# Patient Record
Sex: MALE | Race: BLACK OR AFRICAN AMERICAN | NOT HISPANIC OR LATINO | Employment: OTHER | URBAN - METROPOLITAN AREA
[De-identification: names, ages, dates, MRNs, and addresses within clinical notes are randomized per-mention and may not be internally consistent; named-entity substitution may affect disease eponyms.]

---

## 2022-01-22 PROBLEM — I21.4 NSTEMI (NON-ST ELEVATED MYOCARDIAL INFARCTION): Status: ACTIVE | Noted: 2022-01-22

## 2022-01-22 PROBLEM — I50.1 PULMONARY EDEMA CARDIAC CAUSE: Status: ACTIVE | Noted: 2022-01-22

## 2022-01-22 PROBLEM — E11.65 TYPE 2 DIABETES MELLITUS WITH HYPERGLYCEMIA, WITHOUT LONG-TERM CURRENT USE OF INSULIN: Status: ACTIVE | Noted: 2022-01-22

## 2022-01-22 PROBLEM — I10 ESSENTIAL (PRIMARY) HYPERTENSION: Status: ACTIVE | Noted: 2022-01-22

## 2022-01-25 ENCOUNTER — TELEPHONE (OUTPATIENT)
Dept: CARDIOLOGY | Facility: CLINIC | Age: 57
End: 2022-01-25
Payer: MEDICAID

## 2022-01-25 NOTE — TELEPHONE ENCOUNTER
----- Message from Marnie Teresa sent at 1/25/2022  3:39 PM CST -----  Regarding: advice  Contact: [facility]/[phone]  Type: Needs Medical Advice  Who Called:  [facility]/[phone]    Additional Information: Needs a two month hospital follow up from today. Could not schedule it in Epic due to the insurance. Please call patient to schedule at [phone] .

## 2022-01-25 NOTE — TELEPHONE ENCOUNTER
----- Message from Lisa Sands sent at 1/25/2022  4:12 PM CST -----  Type:  Patient Returning Call    Who Called:  Friend Digna  Who Left Message for Patient:  Orquidea  Does the patient know what this is regarding?:  hospital F/U  Best Call Back Number: [phone]  Additional Information:  Dr Chaudhry performed stints and pt is needing a F/U

## 2022-01-27 ENCOUNTER — TELEPHONE (OUTPATIENT)
Dept: CARDIOLOGY | Facility: CLINIC | Age: 57
End: 2022-01-27
Payer: MEDICAID

## 2022-01-27 NOTE — TELEPHONE ENCOUNTER
"Spoke to pt over the phone , pt states "I have tightness in my chest , no chest pain or Sob . I was doing work outside . Wanted to let Dr chauhan know " Pt is coming in February for office visit   "

## 2022-01-27 NOTE — TELEPHONE ENCOUNTER
----- Message from Sri Jose Ramon sent at 1/27/2022  3:53 PM CST -----  Contact: Patient  Type: Patient Call Back         Who called: Patient         What is the request in detail: calling regarding the stent he has in;; states he had a tightness in his chest and got winded; ust want to speak with someone; please advise        Best call back number: [phone]         Additional Information:   [facility] - [facility] [address] [facility] [address] [facility] [address]  [address]  [facility] [address]  Phone: [phone] Fax: [phone]    [facility]/pharmacy #[id number] - [place], [place] - [address] [place] [id number]  Phone: [phone] Fax: [phone]             Thank You

## 2022-01-28 NOTE — TELEPHONE ENCOUNTER
"Spoke to pt over the phone ifnrom him "  OK KEEP MEDS    Message text      ", per dr chauhan. PT VU   "

## 2022-02-15 ENCOUNTER — OFFICE VISIT (OUTPATIENT)
Dept: CARDIOLOGY | Facility: CLINIC | Age: 57
End: 2022-02-15
Payer: MEDICARE

## 2022-02-15 VITALS
WEIGHT: 183.19 LBS | SYSTOLIC BLOOD PRESSURE: 104 MMHG | HEIGHT: 70 IN | DIASTOLIC BLOOD PRESSURE: 60 MMHG | BODY MASS INDEX: 26.22 KG/M2 | HEART RATE: 59 BPM

## 2022-02-15 DIAGNOSIS — I10 ESSENTIAL (PRIMARY) HYPERTENSION: Chronic | ICD-10-CM

## 2022-02-15 DIAGNOSIS — N52.8 OTHER MALE ERECTILE DYSFUNCTION: Chronic | ICD-10-CM

## 2022-02-15 DIAGNOSIS — E66.3 OVERWEIGHT (BMI 25.0-29.9): Chronic | ICD-10-CM

## 2022-02-15 DIAGNOSIS — I22.2 SUBSEQUENT NON-ST ELEVATION (NSTEMI) MYOCARDIAL INFARCTION: Primary | ICD-10-CM

## 2022-02-15 DIAGNOSIS — Z79.02 LONG TERM (CURRENT) USE OF ANTITHROMBOTICS/ANTIPLATELETS: ICD-10-CM

## 2022-02-15 DIAGNOSIS — E78.00 HYPERCHOLESTEROLEMIA: Chronic | ICD-10-CM

## 2022-02-15 PROCEDURE — 99214 PR OFFICE/OUTPT VISIT, EST, LEVL IV, 30-39 MIN: ICD-10-PCS | Mod: S$GLB,,, | Performed by: INTERNAL MEDICINE

## 2022-02-15 PROCEDURE — 99999 PR PBB SHADOW E&M-EST. PATIENT-LVL III: ICD-10-PCS | Mod: PBBFAC,,, | Performed by: INTERNAL MEDICINE

## 2022-02-15 PROCEDURE — 99214 OFFICE O/P EST MOD 30 MIN: CPT | Mod: S$GLB,,, | Performed by: INTERNAL MEDICINE

## 2022-02-15 PROCEDURE — 99999 PR PBB SHADOW E&M-EST. PATIENT-LVL III: CPT | Mod: PBBFAC,,, | Performed by: INTERNAL MEDICINE

## 2022-02-15 PROCEDURE — 99213 OFFICE O/P EST LOW 20 MIN: CPT | Mod: PBBFAC,PO | Performed by: INTERNAL MEDICINE

## 2022-02-15 RX ORDER — SILDENAFIL 100 MG/1
100 TABLET, FILM COATED ORAL DAILY PRN
Qty: 10 TABLET | Refills: 1 | Status: SHIPPED | OUTPATIENT
Start: 2022-02-15 | End: 2022-05-13 | Stop reason: SDUPTHER

## 2022-02-15 RX ORDER — ATORVASTATIN CALCIUM 10 MG/1
10 TABLET, FILM COATED ORAL NIGHTLY
Qty: 90 TABLET | Refills: 1 | Status: SHIPPED | OUTPATIENT
Start: 2022-02-15 | End: 2022-05-13 | Stop reason: DRUGHIGH

## 2022-02-15 RX ORDER — CLOPIDOGREL BISULFATE 75 MG/1
75 TABLET ORAL DAILY
Qty: 90 TABLET | Refills: 1 | Status: SHIPPED | OUTPATIENT
Start: 2022-02-15 | End: 2022-05-13 | Stop reason: SDUPTHER

## 2022-02-15 RX ORDER — LISINOPRIL 20 MG/1
20 TABLET ORAL DAILY
Qty: 90 TABLET | Refills: 1 | Status: SHIPPED | OUTPATIENT
Start: 2022-02-15 | End: 2022-05-10 | Stop reason: SDUPTHER

## 2022-02-15 RX ORDER — CARVEDILOL 6.25 MG/1
6.25 TABLET ORAL 2 TIMES DAILY
Qty: 180 TABLET | Refills: 1 | Status: SHIPPED | OUTPATIENT
Start: 2022-02-15 | End: 2022-08-01 | Stop reason: SDUPTHER

## 2022-02-15 NOTE — PROGRESS NOTES
Subjective:    Patient ID:  Ramón Reid is a 56 y.o. male who presents for Coronary Artery Disease        HPI    STATUS POST ADMISSION TO [facility] NON STEMI/HYPERTENSIVE URGENCY WAS NOT COMPLIANT WITH MEDICATIONS,, HAD PCI OF THE CIRCUMFLEX ARTERY, DISCUSSED AND REVIEWED, ECHO EF 50%, DOING WELL, FEELS BETTER,ED, SEE ROS  Past Medical History:   Diagnosis Date    Bell's palsy     Diabetes mellitus     Hypertension      Past Surgical History:   Procedure Laterality Date    CORONARY ANGIOGRAPHY N/A 1/24/2022    Procedure: ANGIOGRAM, CORONARY ARTERY;  Surgeon: Brianda Drake MD;  [facility];  Service: Cardiology;  Laterality: N/A;    CORONARY STENT PLACEMENT N/A 1/24/2022    Procedure: INSERTION, STENT, CORONARY ARTERY;  Surgeon: Brianda Drake MD;  [facility];  Service: Cardiology;  Laterality: N/A;    HERNIA REPAIR      LEFT HEART CATHETERIZATION Left 1/24/2022    Procedure: Left heart cath;  Surgeon: Brianda Drake MD;  [facility];  Service: Cardiology;  Laterality: Left;     No family history on file.  Social History     Socioeconomic History    Marital status:    Tobacco Use    Smoking status: Never Smoker    Smokeless tobacco: Never Used   Substance and Sexual Activity    Alcohol use: Not Currently       Review of patient's allergies indicates:  No Known Allergies    Current Outpatient Medications:     aspirin (ECOTRIN) 81 MG EC tablet, Take 1 tablet (81 mg total) by mouth once daily., Disp: 30 tablet, Rfl: 1    blood sugar diagnostic Strp, To check BG 2-3 times daily, to use with insurance preferred meter, Disp: 150 each, Rfl: 1    blood-glucose meter kit, To check BG 2-3 times daily, to use with insurance preferred meter, Disp: 1 each, Rfl: 1    ferrous sulfate (FEOSOL) Tab tablet, Take 1 tablet by mouth daily with breakfast., Disp: , Rfl:     furosemide (LASIX) 20 MG tablet, Take 1 tablet (20 mg total) by mouth once daily., Disp: 30 tablet, Rfl: 1     "gabapentin (NEURONTIN) 400 MG capsule, Take 400 mg by mouth 3 (three) times daily., Disp: , Rfl:     glimepiride (AMARYL) 2 MG tablet, Take 2 mg by mouth once daily., Disp: , Rfl:     lancets Misc, To check BG 2-3 times daily, to use with insurance preferred meter, Disp: 150 each, Rfl: 1    LANTUS SOLOSTAR U-100 INSULIN glargine 100 units/mL (3mL) SubQ pen, Inject 20 Units into the skin every evening., Disp: , Rfl:     multivitamin capsule, Take 1 capsule by mouth once daily., Disp: , Rfl:     pantoprazole (PROTONIX) 20 MG tablet, Take 20 mg by mouth once daily., Disp: , Rfl:     pen needle, diabetic 32 gauge x 5/16" Ndle, To be used with insulin injections, Disp: 100 each, Rfl: 1    vitamin D (VITAMIN D3) 1000 units Tab, Take 1,000 Units by mouth once daily., Disp: , Rfl:     atorvastatin (LIPITOR) 10 MG tablet, Take 1 tablet (10 mg total) by mouth every evening., Disp: 90 tablet, Rfl: 1    carvediloL (COREG) 6.25 MG tablet, Take 1 tablet (6.25 mg total) by mouth 2 (two) times daily., Disp: 180 tablet, Rfl: 1    clopidogreL (PLAVIX) 75 mg tablet, Take 1 tablet (75 mg total) by mouth once daily., Disp: 90 tablet, Rfl: 1    ergocalciferol (ERGOCALCIFEROL) 50,000 unit Cap, Take 50,000 Units by mouth every 7 days., Disp: , Rfl:     lisinopriL (PRINIVIL,ZESTRIL) 20 MG tablet, Take 1 tablet (20 mg total) by mouth once daily., Disp: 90 tablet, Rfl: 1    sildenafiL (VIAGRA) 100 MG tablet, Take 1 tablet (100 mg total) by mouth daily as needed for Erectile Dysfunction., Disp: 10 tablet, Rfl: 1    Review of Systems   Constitutional: Negative for chills, diaphoresis, malaise/fatigue and night sweats.   HENT: Negative for congestion and nosebleeds.    Eyes: Negative for blurred vision and visual disturbance.   Cardiovascular: Negative for chest pain, claudication, cyanosis, dyspnea on exertion, irregular heartbeat, leg swelling, near-syncope, orthopnea, palpitations, paroxysmal nocturnal dyspnea and syncope. " "  Respiratory: Negative for cough, hemoptysis, shortness of breath and wheezing.    Endocrine: Negative for cold intolerance and polyuria.        BG BETTER   Hematologic/Lymphatic: Negative for adenopathy. Does not bruise/bleed easily.   Skin: Negative for color change, itching and rash.   Musculoskeletal: Negative for back pain (CHRONIC), falls and joint pain.   Gastrointestinal: Negative for abdominal pain, change in bowel habit, dysphagia, heartburn, hematemesis, jaundice, melena and vomiting.   Genitourinary: Negative for dysuria, flank pain and frequency.   Neurological: Negative for brief paralysis, dizziness, focal weakness, light-headedness, loss of balance, numbness, paresthesias, seizures, sensory change, tremors and weakness.   Psychiatric/Behavioral: Negative for altered mental status, depression, memory loss and substance abuse. The patient is not nervous/anxious.    Allergic/Immunologic: Negative for environmental allergies and persistent infections.        Objective:      Vitals:    02/15/22 1337   BP: 104/60   Pulse: (!) 59   Weight: 83.1 kg (183 lb 3.2 oz)   Height: 5' 10" (1.778 m)   PainSc: 0-No pain     Body mass index is 26.29 kg/m².    Physical Exam  HENT:      Head: Normocephalic and atraumatic.   Eyes:      General: No scleral icterus.     Extraocular Movements: Extraocular movements intact.   Neck:      Vascular: No carotid bruit.   Cardiovascular:      Rate and Rhythm: Normal rate and regular rhythm.      Heart sounds: No murmur heard.  No friction rub. No gallop.    Pulmonary:      Effort: Pulmonary effort is normal.      Breath sounds: Normal breath sounds. No rales.   Abdominal:      Palpations: Abdomen is soft.      Tenderness: There is no abdominal tenderness.   Musculoskeletal:      Cervical back: Neck supple.      Right lower leg: No edema.      Left lower leg: No edema.   Skin:     General: Skin is warm and dry.      Capillary Refill: Capillary refill takes less than 2 seconds. "   Neurological:      General: No focal deficit present.      Mental Status: He is alert.      Cranial Nerves: No cranial nerve deficit.   Psychiatric:         Mood and Affect: Mood normal.         Behavior: Behavior normal.                 ..    Chemistry        Component Value Date/Time     01/25/2022 0637    K 4.5 01/25/2022 0637     01/25/2022 0637    CO2 31 01/25/2022 0637    BUN 30 (H) 01/25/2022 0637    CREATININE 1.13 01/25/2022 0637     (H) 01/25/2022 0637        Component Value Date/Time    CALCIUM 9.5 01/25/2022 0637    ALKPHOS 72 01/22/2022 0818    AST 41 01/22/2022 0818    ALT 47 01/22/2022 0818    BILITOT 0.4 01/22/2022 0818    ESTGFRAFRICA >60 01/25/2022 0637    EGFRNONAA >60 01/25/2022 0637            ..  Lab Results   Component Value Date    CHOL 178 01/22/2022     Lab Results   Component Value Date    HDL 48 01/22/2022     Lab Results   Component Value Date    LDLCALC 107.2 01/22/2022     Lab Results   Component Value Date    TRIG 114 01/22/2022     Lab Results   Component Value Date    CHOLHDL 27.0 01/22/2022     ..  Lab Results   Component Value Date    WBC 6.13 01/25/2022    HGB 12.7 (L) 01/25/2022    HCT 41.2 01/25/2022    MCV 72 (L) 01/25/2022     01/25/2022       Test(s) Reviewed  I have reviewed the following in detail:  [] Stress test   [x] Angiography   [x] Echocardiogram   [x] Labs   [] Other:       Assessment:         ICD-10-CM ICD-9-CM   1. Subsequent non-ST elevation (NSTEMI) myocardial infarction  I22.2 410.70   2. Long term (current) use of antithrombotics/antiplatelets  Z79.02 V58.63   3. Other male erectile dysfunction  N52.8 607.84   4. Essential (primary) hypertension  I10 401.9   5. Hypercholesterolemia  E78.00 272.0   6. Overweight (BMI 25.0-29.9)  E66.3 278.02     Problem List Items Addressed This Visit        Cardiac/Vascular    Essential (primary) hypertension    Relevant Orders    Comprehensive Metabolic Panel    Subsequent non-ST elevation (NSTEMI)  myocardial infarction - Primary    Relevant Orders    Comprehensive Metabolic Panel    Hypercholesterolemia    Relevant Orders    Comprehensive Metabolic Panel    Lipid Panel       Renal/    Other male erectile dysfunction       Hematology    Long term (current) use of antithrombotics/antiplatelets    Relevant Orders    Hemoglobin       Endocrine    Overweight (BMI 25.0-29.9)           Plan:         OK FOR VIGARA,ALL CV CLINICALLY STABLE, NO ANGINA, NO HF, NO TIA, NO CLINICAL ARRHYTHMIA,CONTINUE CURRENT MEDS, EDUCATION, DIET, EXERCISE, STAY ACTIVE WEIGHT LOSS COMPLIANCE WITH MEDICATION RETURN TO CLINIC IN 5 MO WITH LABS, DISCUSSED PLAN WITH THE PATIENT AND HIS GIRLFRIEND  Subsequent non-ST elevation (NSTEMI) myocardial infarction  Comments:  STABLE  Orders:  -     Comprehensive Metabolic Panel; Future; Expected date: 08/15/2022    Long term (current) use of antithrombotics/antiplatelets  Comments:  CONTINUE EXPLAIN  Orders:  -     Hemoglobin; Future; Expected date: 08/15/2022    Other male erectile dysfunction  Comments:  OKAY FOR MEDS    Essential (primary) hypertension  Comments:  NOW MUCH BETTER COMPLETE ROLLED WITH MEDS COMPLIANCE  Orders:  -     Comprehensive Metabolic Panel; Future; Expected date: 08/15/2022    Hypercholesterolemia  Comments:  DIET AND MEDS  Orders:  -     Comprehensive Metabolic Panel; Future; Expected date: 08/15/2022  -     Lipid Panel; Future; Expected date: 08/15/2022    Overweight (BMI 25.0-29.9)  Comments:  WEIGHT LOSS    Other orders  -     clopidogreL (PLAVIX) 75 mg tablet; Take 1 tablet (75 mg total) by mouth once daily.  Dispense: 90 tablet; Refill: 1  -     sildenafiL (VIAGRA) 100 MG tablet; Take 1 tablet (100 mg total) by mouth daily as needed for Erectile Dysfunction.  Dispense: 10 tablet; Refill: 1  -     atorvastatin (LIPITOR) 10 MG tablet; Take 1 tablet (10 mg total) by mouth every evening.  Dispense: 90 tablet; Refill: 1  -     carvediloL (COREG) 6.25 MG tablet; Take 1  tablet (6.25 mg total) by mouth 2 (two) times daily.  Dispense: 180 tablet; Refill: 1  -     lisinopriL (PRINIVIL,ZESTRIL) 20 MG tablet; Take 1 tablet (20 mg total) by mouth once daily.  Dispense: 90 tablet; Refill: 1    RTC Low level/low impact aerobic exercise 5x's/wk. Heart healthy diet and risk factor modification.    See labs and med orders.    Aerobic exercise 5x's/wk. Heart healthy diet and risk factor modification.    See labs and med orders.

## 2022-03-10 ENCOUNTER — TELEPHONE (OUTPATIENT)
Dept: CARDIOLOGY | Facility: CLINIC | Age: 57
End: 2022-03-10
Payer: MEDICARE

## 2022-03-10 NOTE — TELEPHONE ENCOUNTER
Pt is schedule to have a lumbar spine fusion surgery with Dr Maco Boyer. Cardiac clearance has been requested . How many days prior pt needs to hold plavix and asa? Please advise. Thank you     Fax to [phone]  Last ov 2/15/22

## 2022-05-04 ENCOUNTER — TELEPHONE (OUTPATIENT)
Dept: CARDIOLOGY | Facility: CLINIC | Age: 57
End: 2022-05-04
Payer: MEDICARE

## 2022-05-04 NOTE — TELEPHONE ENCOUNTER
----- Message from Cristy Wilson LPN sent at 5/3/2022  4:01 PM CDT -----  Contact: pt wife    ----- Message -----  From: Anna Garrison  Sent: 5/3/2022   4:00 PM CDT  To: Noelle [facility]ER[facility] Staff    Type:  Sooner Appointment Request    Caller is requesting a sooner appointment.  Caller declined first available appointment listed below.  Caller will not accept being placed on the waitlist and is requesting a message be sent to doctor.    Name of Caller: pt wife gordo  When is the first available appointment? none  Symptoms: blood pressure been extremely high and vision is blurry  Best Call Back Number:  [phone]   Additional Information:  pt blood pressure been very high and pcp said he need to make a appt to see dr to get blood pressure regulated

## 2022-05-09 ENCOUNTER — TELEPHONE (OUTPATIENT)
Dept: CARDIOLOGY | Facility: CLINIC | Age: 57
End: 2022-05-09
Payer: MEDICARE

## 2022-05-09 DIAGNOSIS — E78.00 HYPERCHOLESTEROLEMIA: Primary | ICD-10-CM

## 2022-05-09 RX ORDER — LISINOPRIL 20 MG/1
20 TABLET ORAL 2 TIMES DAILY
Qty: 60 TABLET | Refills: 0 | Status: SHIPPED | OUTPATIENT
Start: 2022-05-09 | End: 2022-11-17 | Stop reason: SDUPTHER

## 2022-05-09 RX ORDER — AMLODIPINE BESYLATE 2.5 MG/1
2.5 TABLET ORAL DAILY
Qty: 30 TABLET | Refills: 0 | Status: SHIPPED | OUTPATIENT
Start: 2022-05-09 | End: 2022-05-10 | Stop reason: SDUPTHER

## 2022-05-09 NOTE — TELEPHONE ENCOUNTER
"Spoke to pt over the phone inform him "HAD APPT TODAY, ? CX, INCREASE LISINOPRIL TO 20 BID, ADD NORVASC 2.5 MG AND DO ORDERED LABS ", per dr chauhan. PT VU   "

## 2022-05-09 NOTE — TELEPHONE ENCOUNTER
----- Message from Shakira  sent at 5/9/2022  1:25 PM CDT -----  Regarding: appt access  Type: Needs Medical Advice    Who Called:      Best Call Back Number: [phone]  Additional Information: Requesting a call back from Nurse, regarding pt cant make appt today because of transportation and needs to be see soon BP has been very high 200/150 and new Rx are not working ,please and call back soon

## 2022-05-09 NOTE — TELEPHONE ENCOUNTER
HAD APPT TODAY, ? CX, INCREASE LISINOPRIL TO 20 BID, ADD NORVASC 2.5 MG AND DO ORDERED LABS , per dr chauhan

## 2022-05-09 NOTE — TELEPHONE ENCOUNTER
----- Message from Anna Longoria sent at 5/9/2022  2:16 PM CDT -----  Regarding: returning call  Contact: pt  Type:  Patient Returning Call    Who Called:  jean paul  Who Left Message for Patient:  marivel  Does the patient know what this is regarding?:  n/a  Best Call Back Number:  [phone]  Additional Information:  please call back

## 2022-05-09 NOTE — TELEPHONE ENCOUNTER
"Pt states " My Bp is been really high sometimes is 200/150 and sometimes 190/100 or 180/96 . I already had appt with my PCP but my Bp still ze " pt was advise to go to ER if Bp remains high. Appt with  Dr chauhan was reschedule for 5/13/22 at 11 am , since pt states " I do not have a car today so I would not be able to go to [place] "  "

## 2022-05-09 NOTE — TELEPHONE ENCOUNTER
attempted to call pt , no answer, left voice message to inform pt that we got a message saying that his BP is 200/150 and that if this is the case , he must go to the ER ASAP.

## 2022-05-10 ENCOUNTER — LAB VISIT (OUTPATIENT)
Dept: FAMILY MEDICINE | Facility: CLINIC | Age: 57
End: 2022-05-10
Payer: MEDICARE

## 2022-05-10 DIAGNOSIS — I10 ESSENTIAL (PRIMARY) HYPERTENSION: Primary | ICD-10-CM

## 2022-05-10 DIAGNOSIS — E78.00 HYPERCHOLESTEROLEMIA: ICD-10-CM

## 2022-05-10 PROCEDURE — 85018 HEMOGLOBIN: CPT | Performed by: INTERNAL MEDICINE

## 2022-05-10 PROCEDURE — 36415 COLL VENOUS BLD VENIPUNCTURE: CPT | Mod: S$GLB,,, | Performed by: INTERNAL MEDICINE

## 2022-05-10 PROCEDURE — 80061 LIPID PANEL: CPT | Performed by: INTERNAL MEDICINE

## 2022-05-10 PROCEDURE — 80053 COMPREHEN METABOLIC PANEL: CPT | Performed by: INTERNAL MEDICINE

## 2022-05-10 PROCEDURE — 36415 PR COLLECTION VENOUS BLOOD,VENIPUNCTURE: ICD-10-PCS | Mod: S$GLB,,, | Performed by: INTERNAL MEDICINE

## 2022-05-10 RX ORDER — LISINOPRIL 20 MG/1
20 TABLET ORAL 2 TIMES DAILY
Qty: 60 TABLET | Refills: 0 | Status: SHIPPED | OUTPATIENT
Start: 2022-05-10 | End: 2022-06-11

## 2022-05-10 RX ORDER — AMLODIPINE BESYLATE 2.5 MG/1
2.5 TABLET ORAL DAILY
Qty: 30 TABLET | Refills: 0 | Status: SHIPPED | OUTPATIENT
Start: 2022-05-10 | End: 2022-05-13 | Stop reason: SDUPTHER

## 2022-05-10 NOTE — TELEPHONE ENCOUNTER
----- Message from Bev Brito sent at 5/10/2022  8:41 AM CDT -----  Contact: pt  Type:  RX Refill Request    Who Called:  pt  Refill or New Rx:  refill  RX Name and Strength:    lisinopriL (PRINIVIL,ZESTRIL) 20 MG tablet  amLODIPine (NORVASC) 2.5 MG tablet    How is the patient currently taking it? (ex. 1XDay):  As Directed  Is this a 30 day or 90 day RX:  90  Preferred Pharmacy with phone number:    [facility]/pharmacy #[id number] [address] [address]  Phone: [phone] Fax: [phone]      Local or Mail Order:  local  Ordering Provider:  Noelle Padilla Call Back Number:  [phone]    Additional Information:  Please contact pt upon completion-Thank you~

## 2022-05-11 LAB
ALBUMIN SERPL BCP-MCNC: 3.5 G/DL (ref 3.5–5.2)
ALP SERPL-CCNC: 68 U/L (ref 55–135)
ALT SERPL W/O P-5'-P-CCNC: 21 U/L (ref 10–44)
ANION GAP SERPL CALC-SCNC: 10 MMOL/L (ref 8–16)
AST SERPL-CCNC: 17 U/L (ref 10–40)
BILIRUB SERPL-MCNC: 0.3 MG/DL (ref 0.1–1)
BUN SERPL-MCNC: 27 MG/DL (ref 6–20)
CALCIUM SERPL-MCNC: 9.3 MG/DL (ref 8.7–10.5)
CHLORIDE SERPL-SCNC: 102 MMOL/L (ref 95–110)
CHOLEST SERPL-MCNC: 142 MG/DL (ref 120–199)
CHOLEST/HDLC SERPL: 3.6 {RATIO} (ref 2–5)
CO2 SERPL-SCNC: 28 MMOL/L (ref 23–29)
CREAT SERPL-MCNC: 1.9 MG/DL (ref 0.5–1.4)
EST. GFR  (AFRICAN AMERICAN): 44.9 ML/MIN/1.73 M^2
EST. GFR  (NON AFRICAN AMERICAN): 38.8 ML/MIN/1.73 M^2
GLUCOSE SERPL-MCNC: 109 MG/DL (ref 70–110)
HDLC SERPL-MCNC: 39 MG/DL (ref 40–75)
HDLC SERPL: 27.5 % (ref 20–50)
HGB BLD-MCNC: 12.6 G/DL (ref 14–18)
LDLC SERPL CALC-MCNC: 89 MG/DL (ref 63–159)
NONHDLC SERPL-MCNC: 103 MG/DL
POTASSIUM SERPL-SCNC: 3.9 MMOL/L (ref 3.5–5.1)
PROT SERPL-MCNC: 6.5 G/DL (ref 6–8.4)
SODIUM SERPL-SCNC: 140 MMOL/L (ref 136–145)
TRIGL SERPL-MCNC: 70 MG/DL (ref 30–150)

## 2022-05-13 ENCOUNTER — OFFICE VISIT (OUTPATIENT)
Dept: CARDIOLOGY | Facility: CLINIC | Age: 57
End: 2022-05-13
Payer: MEDICARE

## 2022-05-13 VITALS
HEIGHT: 70 IN | WEIGHT: 182.13 LBS | HEART RATE: 66 BPM | SYSTOLIC BLOOD PRESSURE: 138 MMHG | BODY MASS INDEX: 26.07 KG/M2 | DIASTOLIC BLOOD PRESSURE: 88 MMHG

## 2022-05-13 DIAGNOSIS — I10 ESSENTIAL (PRIMARY) HYPERTENSION: Primary | Chronic | ICD-10-CM

## 2022-05-13 DIAGNOSIS — I25.10 CORONARY ARTERY DISEASE INVOLVING NATIVE CORONARY ARTERY OF NATIVE HEART WITHOUT ANGINA PECTORIS: Chronic | ICD-10-CM

## 2022-05-13 DIAGNOSIS — E78.00 HYPERCHOLESTEROLEMIA: Chronic | ICD-10-CM

## 2022-05-13 DIAGNOSIS — R79.89 ELEVATED SERUM CREATININE: ICD-10-CM

## 2022-05-13 DIAGNOSIS — E66.3 OVERWEIGHT (BMI 25.0-29.9): Chronic | ICD-10-CM

## 2022-05-13 PROCEDURE — 4010F ACE/ARB THERAPY RXD/TAKEN: CPT | Mod: CPTII,S$GLB,, | Performed by: INTERNAL MEDICINE

## 2022-05-13 PROCEDURE — 3008F BODY MASS INDEX DOCD: CPT | Mod: CPTII,S$GLB,, | Performed by: INTERNAL MEDICINE

## 2022-05-13 PROCEDURE — 3079F DIAST BP 80-89 MM HG: CPT | Mod: CPTII,S$GLB,, | Performed by: INTERNAL MEDICINE

## 2022-05-13 PROCEDURE — 99999 PR PBB SHADOW E&M-EST. PATIENT-LVL III: ICD-10-PCS | Mod: PBBFAC,,, | Performed by: INTERNAL MEDICINE

## 2022-05-13 PROCEDURE — 3075F PR MOST RECENT SYSTOLIC BLOOD PRESS GE 130-139MM HG: ICD-10-PCS | Mod: CPTII,S$GLB,, | Performed by: INTERNAL MEDICINE

## 2022-05-13 PROCEDURE — 99214 PR OFFICE/OUTPT VISIT, EST, LEVL IV, 30-39 MIN: ICD-10-PCS | Mod: S$GLB,,, | Performed by: INTERNAL MEDICINE

## 2022-05-13 PROCEDURE — 3075F SYST BP GE 130 - 139MM HG: CPT | Mod: CPTII,S$GLB,, | Performed by: INTERNAL MEDICINE

## 2022-05-13 PROCEDURE — 1159F MED LIST DOCD IN RCRD: CPT | Mod: CPTII,S$GLB,, | Performed by: INTERNAL MEDICINE

## 2022-05-13 PROCEDURE — 3052F PR MOST RECENT HEMOGLOBIN A1C LEVEL 8.0 - < 9.0%: ICD-10-PCS | Mod: CPTII,S$GLB,, | Performed by: INTERNAL MEDICINE

## 2022-05-13 PROCEDURE — 99999 PR PBB SHADOW E&M-EST. PATIENT-LVL III: CPT | Mod: PBBFAC,,, | Performed by: INTERNAL MEDICINE

## 2022-05-13 PROCEDURE — 1159F PR MEDICATION LIST DOCUMENTED IN MEDICAL RECORD: ICD-10-PCS | Mod: CPTII,S$GLB,, | Performed by: INTERNAL MEDICINE

## 2022-05-13 PROCEDURE — 3079F PR MOST RECENT DIASTOLIC BLOOD PRESSURE 80-89 MM HG: ICD-10-PCS | Mod: CPTII,S$GLB,, | Performed by: INTERNAL MEDICINE

## 2022-05-13 PROCEDURE — 3052F HG A1C>EQUAL 8.0%<EQUAL 9.0%: CPT | Mod: CPTII,S$GLB,, | Performed by: INTERNAL MEDICINE

## 2022-05-13 PROCEDURE — 4010F PR ACE/ARB THEARPY RXD/TAKEN: ICD-10-PCS | Mod: CPTII,S$GLB,, | Performed by: INTERNAL MEDICINE

## 2022-05-13 PROCEDURE — 3008F PR BODY MASS INDEX (BMI) DOCUMENTED: ICD-10-PCS | Mod: CPTII,S$GLB,, | Performed by: INTERNAL MEDICINE

## 2022-05-13 PROCEDURE — 99214 OFFICE O/P EST MOD 30 MIN: CPT | Mod: S$GLB,,, | Performed by: INTERNAL MEDICINE

## 2022-05-13 RX ORDER — ATORVASTATIN CALCIUM 20 MG/1
20 TABLET, FILM COATED ORAL NIGHTLY
Qty: 90 TABLET | Refills: 1 | Status: SHIPPED | OUTPATIENT
Start: 2022-05-13 | End: 2022-11-03

## 2022-05-13 RX ORDER — CLOPIDOGREL BISULFATE 75 MG/1
75 TABLET ORAL DAILY
Qty: 90 TABLET | Refills: 1 | Status: SHIPPED | OUTPATIENT
Start: 2022-05-13 | End: 2022-11-03

## 2022-05-13 RX ORDER — SILDENAFIL 100 MG/1
100 TABLET, FILM COATED ORAL DAILY PRN
Qty: 10 TABLET | Refills: 1 | Status: SHIPPED | OUTPATIENT
Start: 2022-05-13 | End: 2023-05-13

## 2022-05-13 RX ORDER — AMLODIPINE BESYLATE 2.5 MG/1
2.5 TABLET ORAL DAILY
Qty: 30 TABLET | Refills: 2 | Status: SHIPPED | OUTPATIENT
Start: 2022-05-13 | End: 2022-05-16 | Stop reason: SDUPTHER

## 2022-05-13 NOTE — PROGRESS NOTES
Subjective:    Patient ID:  Ramón Reid is a 56 y.o. male who presents for Hypertension and Coronary Artery Disease        HPI  S LABS AND GOALS LDL 89 HDL 39, HEMOGLOBIN 12.9, CMP CREATININE 1.9 BLOOD PRESSURE HAS BEEN HIGH, STATES COMPLIANT WITH MEDICATIONS, BUT CONFUSED WITH GETTING MEDICINE [facility], STILL TAKING LISINOPRIL 30MG, ON MELOXICAM, DID NOT GET NORVASC,TAKING LASIX, SWEATS TOO MUCH OUTSIDE, SEE ROS    Past Medical History:   Diagnosis Date    Bell's palsy     Diabetes mellitus     Hypertension      Past Surgical History:   Procedure Laterality Date    CORONARY ANGIOGRAPHY N/A 1/24/2022    Procedure: ANGIOGRAM, CORONARY ARTERY;  Surgeon: Brianda Drake MD;  Location: [facility] C[facility];  Service: Cardiology;  Laterality: N/A;    CORONARY STENT PLACEMENT N/A 1/24/2022    Procedure: INSERTION, STENT, CORONARY ARTERY;  Surgeon: Brianda Drake MD;  Location: [facility];  Service: Cardiology;  Laterality: N/A;    HERNIA REPAIR      LEFT HEART CATHETERIZATION Left 1/24/2022    Procedure: Left heart cath;  Surgeon: Brianda Drake MD;  Location: [facility];  Service: Cardiology;  Laterality: Left;     No family history on file.  Social History     Socioeconomic History    Marital status:    Tobacco Use    Smoking status: Never Smoker    Smokeless tobacco: Never Used   Substance and Sexual Activity    Alcohol use: Not Currently       Review of patient's allergies indicates:  No Known Allergies    Current Outpatient Medications:     aspirin (ECOTRIN) 81 MG EC tablet, Take 1 tablet (81 mg total) by mouth once daily., Disp: 30 tablet, Rfl: 1    blood sugar diagnostic Strp, To check BG 2-3 times daily, to use with insurance preferred meter, Disp: 150 each, Rfl: 1    blood-glucose meter kit, To check BG 2-3 times daily, to use with insurance preferred meter, Disp: 1 each, Rfl: 1    carvediloL (COREG) 6.25 MG tablet, Take 1 tablet (6.25 mg total) by mouth 2 (two) times daily., Disp: 180 tablet,  "Rfl: 1    ferrous sulfate (FEOSOL) Tab tablet, Take 1 tablet by mouth daily with breakfast., Disp: , Rfl:     glimepiride (AMARYL) 2 MG tablet, Take 2 mg by mouth once daily., Disp: , Rfl:     lancets Misc, To check BG 2-3 times daily, to use with insurance preferred meter, Disp: 150 each, Rfl: 1    LANTUS SOLOSTAR U-100 INSULIN glargine 100 units/mL (3mL) SubQ pen, Inject 20 Units into the skin every evening., Disp: , Rfl:     lisinopriL (PRINIVIL,ZESTRIL) 20 MG tablet, Take 1 tablet (20 mg total) by mouth 2 (two) times a day., Disp: 60 tablet, Rfl: 0    lisinopriL (PRINIVIL,ZESTRIL) 20 MG tablet, Take 1 tablet (20 mg total) by mouth 2 (two) times a day., Disp: 60 tablet, Rfl: 0    multivitamin capsule, Take 1 capsule by mouth once daily., Disp: , Rfl:     pen needle, diabetic 32 gauge x 5/16" Ndle, To be used with insulin injections, Disp: 100 each, Rfl: 1    vitamin D (VITAMIN D3) 1000 units Tab, Take 1,000 Units by mouth once daily., Disp: , Rfl:     amLODIPine (NORVASC) 2.5 MG tablet, Take 1 tablet (2.5 mg total) by mouth once daily., Disp: 30 tablet, Rfl: 2    atorvastatin (LIPITOR) 20 MG tablet, Take 1 tablet (20 mg total) by mouth every evening., Disp: 90 tablet, Rfl: 1    clopidogreL (PLAVIX) 75 mg tablet, Take 1 tablet (75 mg total) by mouth once daily., Disp: 90 tablet, Rfl: 1    gabapentin (NEURONTIN) 400 MG capsule, Take 400 mg by mouth 3 (three) times daily., Disp: , Rfl:     sildenafiL (VIAGRA) 100 MG tablet, Take 1 tablet (100 mg total) by mouth daily as needed for Erectile Dysfunction., Disp: 10 tablet, Rfl: 1    Review of Systems   Constitutional: Negative for chills, diaphoresis, malaise/fatigue and night sweats.   HENT: Negative for congestion and nosebleeds.    Eyes: Negative for blurred vision and visual disturbance.   Cardiovascular: Negative for chest pain, claudication, cyanosis, dyspnea on exertion, irregular heartbeat, leg swelling, near-syncope, orthopnea, palpitations, " "paroxysmal nocturnal dyspnea and syncope.   Respiratory: Negative for cough, hemoptysis, shortness of breath and wheezing.    Endocrine: Negative.    Hematologic/Lymphatic: Negative for adenopathy. Does not bruise/bleed easily.   Skin: Negative for color change and rash.   Musculoskeletal: Negative for back pain (CHRONIC) and falls.   Gastrointestinal: Negative for abdominal pain, change in bowel habit, dysphagia, heartburn, melena, nausea and vomiting.   Genitourinary: Negative.  Negative for dysuria, flank pain and frequency.   Neurological: Negative for brief paralysis, dizziness, focal weakness, light-headedness, loss of balance, numbness, paresthesias and weakness.   Psychiatric/Behavioral: Negative for altered mental status, depression and memory loss.        Objective:      Vitals:    05/13/22 1048 05/13/22 1109   BP: (!) 161/90 138/88   Pulse: 66    Weight: 82.6 kg (182 lb 1.6 oz)    Height: 5' 10" (1.778 m)    PainSc: 0-No pain      Body mass index is 26.13 kg/m².    Physical Exam  HENT:      Head: Normocephalic and atraumatic.      Mouth/Throat:      Mouth: Mucous membranes are dry.   Eyes:      General: No scleral icterus.  Neck:      Vascular: No carotid bruit.   Cardiovascular:      Rate and Rhythm: Normal rate and regular rhythm.      Heart sounds:     No friction rub. No gallop.   Pulmonary:      Breath sounds: No rales.   Abdominal:      Palpations: Abdomen is soft.      Tenderness: There is no abdominal tenderness.   Musculoskeletal:      Cervical back: Neck supple.      Right lower leg: No edema.      Left lower leg: No edema.   Skin:     General: Skin is warm and dry.      Capillary Refill: Capillary refill takes less than 2 seconds.   Neurological:      General: No focal deficit present.      Mental Status: He is alert and oriented to person, place, and time.      Cranial Nerves: No cranial nerve deficit.   Psychiatric:         Mood and Affect: Mood normal.         Behavior: Behavior normal. "                 ..    Chemistry        Component Value Date/Time     05/10/2022 0844    K 3.9 05/10/2022 0844     05/10/2022 0844    CO2 28 05/10/2022 0844    BUN 27 (H) 05/10/2022 0844    CREATININE 1.9 (H) 05/10/2022 0844     05/10/2022 0844        Component Value Date/Time    CALCIUM 9.3 05/10/2022 0844    ALKPHOS 68 05/10/2022 0844    AST 17 05/10/2022 0844    ALT 21 05/10/2022 0844    BILITOT 0.3 05/10/2022 0844    ESTGFRAFRICA 44.9 (A) 05/10/2022 0844    EGFRNONAA 38.8 (A) 05/10/2022 0844            ..  Lab Results   Component Value Date    CHOL 142 05/10/2022    CHOL 178 01/22/2022     Lab Results   Component Value Date    HDL 39 (L) 05/10/2022    HDL 48 01/22/2022     Lab Results   Component Value Date    LDLCALC 89.0 05/10/2022    LDLCALC 107.2 01/22/2022     Lab Results   Component Value Date    TRIG 70 05/10/2022    TRIG 114 01/22/2022     Lab Results   Component Value Date    CHOLHDL 27.5 05/10/2022    CHOLHDL 27.0 01/22/2022     ..  Lab Results   Component Value Date    WBC 6.13 01/25/2022    HGB 12.6 (L) 05/10/2022    HCT 41.2 01/25/2022    MCV 72 (L) 01/25/2022     01/25/2022       Test(s) Reviewed  I have reviewed the following in detail:  [] Stress test   [] Angiography   [] Echocardiogram   [x] Labs   [] Other:       Assessment:         ICD-10-CM ICD-9-CM   1. Essential (primary) hypertension  I10 401.9   2. Coronary artery disease involving native coronary artery of native heart without angina pectoris  I25.10 414.01   3. Elevated serum creatinine  R79.89 790.99   4. Hypercholesterolemia  E78.00 272.0   5. Overweight (BMI 25.0-29.9)  E66.3 278.02     Problem List Items Addressed This Visit        Cardiac/Vascular    Essential (primary) hypertension - Primary    Relevant Medications    amLODIPine (NORVASC) 2.5 MG tablet    Other Relevant Orders    Basic Metabolic Panel    Hypercholesterolemia    Relevant Medications    atorvastatin (LIPITOR) 20 MG tablet    Coronary artery  disease involving native coronary artery of native heart without angina pectoris    Relevant Medications    atorvastatin (LIPITOR) 20 MG tablet       Renal/    Elevated serum creatinine    Relevant Orders    Basic Metabolic Panel       Endocrine    Overweight (BMI 25.0-29.9)           Plan:     START AMLODIPINE, AVOID NSAIDS IN VIEW OF HYPERTENSION CAD AND ELEVATED CREATININE,, HYDRATION, INCREASE LIPITOR TO 20 MG, TARGET LDL LOWER WITH CAD DC LASIX, CHECK BMP SOON, RETURN TO CLINIC IN,3 MO, ALL OTHER CV CLINICALLY STABLE, NO ANGINA, NO HF, NO TIA, NO CLINICAL ARRHYTHMIA,CONTINUE CURRENT MEDS, EDUCATION, DIET, EXERCISE, WEIGHT LOSS DISCUSSED PLAN WITH THE PATIENT AND HIS GIRLFRIEND      Essential (primary) hypertension  Comments:  ADJUST MEDS AND COMPLIANCE  Orders:  -     amLODIPine (NORVASC) 2.5 MG tablet; Take 1 tablet (2.5 mg total) by mouth once daily.  Dispense: 30 tablet; Refill: 2  -     Basic Metabolic Panel; Future; Expected date: 05/27/2022    Coronary artery disease involving native coronary artery of native heart without angina pectoris  -     atorvastatin (LIPITOR) 20 MG tablet; Take 1 tablet (20 mg total) by mouth every evening.  Dispense: 90 tablet; Refill: 1    Elevated serum creatinine  Comments:  HYDRATION REASSESS AVOID NSAID  Orders:  -     Basic Metabolic Panel; Future; Expected date: 05/27/2022    Hypercholesterolemia  -     atorvastatin (LIPITOR) 20 MG tablet; Take 1 tablet (20 mg total) by mouth every evening.  Dispense: 90 tablet; Refill: 1    Overweight (BMI 25.0-29.9)  Comments:  WEIGHT LOSS    Other orders  -     sildenafiL (VIAGRA) 100 MG tablet; Take 1 tablet (100 mg total) by mouth daily as needed for Erectile Dysfunction.  Dispense: 10 tablet; Refill: 1  -     clopidogreL (PLAVIX) 75 mg tablet; Take 1 tablet (75 mg total) by mouth once daily.  Dispense: 90 tablet; Refill: 1    RTC Low level/low impact aerobic exercise 5x's/wk. Heart healthy diet and risk factor modification.    See  labs and med orders.    Aerobic exercise 5x's/wk. Heart healthy diet and risk factor modification.    See labs and med orders.

## 2022-05-16 ENCOUNTER — TELEPHONE (OUTPATIENT)
Dept: CARDIOLOGY | Facility: CLINIC | Age: 57
End: 2022-05-16
Payer: MEDICARE

## 2022-05-16 DIAGNOSIS — I10 ESSENTIAL (PRIMARY) HYPERTENSION: Chronic | ICD-10-CM

## 2022-05-16 RX ORDER — AMLODIPINE BESYLATE 5 MG/1
5 TABLET ORAL DAILY
Qty: 90 TABLET | Refills: 0 | Status: SHIPPED | OUTPATIENT
Start: 2022-05-16

## 2022-05-16 NOTE — TELEPHONE ENCOUNTER
"Spoke to pt kiesha , she states "Pt still having really  high bp and still is not going down any . His bp was this morning  was 197/107. He gave two new medications last week . I just need to let dr chauhan know   "

## 2022-05-16 NOTE — TELEPHONE ENCOUNTER
"Spoke to pt over the phone, inform him "Home machine must be wrong his blood pressure was not that high in the office, increase Norvasc to 5 mg daily". understanding was verbalized.   "

## 2022-05-16 NOTE — TELEPHONE ENCOUNTER
----- Message from Alysha Mendoza sent at 5/16/2022 10:31 AM CDT -----  Who Called: Wife    What is the reqeust in detail: Requesting call back to discuss rising blood pressure. Patient's blood pressure has not gotten any better despite the change in his medication. Caller is asking what to do now. Please advise.     Can the clinic reply by [facility]? No    Best Call Back Number: [phone]    Additional Information:

## 2022-05-16 NOTE — TELEPHONE ENCOUNTER
""Home machine must be wrong his blood pressure was not that high in the office, increase Norvasc to 5 mg daily"  "

## 2022-05-18 ENCOUNTER — TELEPHONE (OUTPATIENT)
Dept: CARDIOLOGY | Facility: CLINIC | Age: 57
End: 2022-05-18
Payer: MEDICARE

## 2022-05-18 NOTE — TELEPHONE ENCOUNTER
"Spoke to pt wife sri" she states mr scruggs bp still high is 177/113 and 180/120. He already taking Norvasc 5 mg daily. I will check his Bp with a different machine and will call you back "  "

## 2022-05-18 NOTE — TELEPHONE ENCOUNTER
----- Message from Maya Chen sent at 5/18/2022 10:50 AM CDT -----  Regarding: Patient Advice        Name of Who is Calling: Digna Aguilar (Friend)    Who Left The Message: Digna Aguilar (Friend)      What is the request in detail:    Please give the above patient's friend a call back regarding the elevated BP of  Ramón Reid (188 over 120).  Please give a call back at your earliest convenience and further advise.   Thank you!      Reply by [facility]:  No    Preferred Call Back  :   [phone] ([phone])

## 2022-05-27 ENCOUNTER — LAB VISIT (OUTPATIENT)
Dept: FAMILY MEDICINE | Facility: CLINIC | Age: 57
End: 2022-05-27
Payer: MEDICARE

## 2022-05-27 DIAGNOSIS — I10 ESSENTIAL (PRIMARY) HYPERTENSION: Chronic | ICD-10-CM

## 2022-05-27 DIAGNOSIS — R79.89 ELEVATED SERUM CREATININE: ICD-10-CM

## 2022-05-27 LAB
ANION GAP SERPL CALC-SCNC: 7 MMOL/L (ref 8–16)
BUN SERPL-MCNC: 15 MG/DL (ref 6–20)
CALCIUM SERPL-MCNC: 9.2 MG/DL (ref 8.7–10.5)
CHLORIDE SERPL-SCNC: 103 MMOL/L (ref 95–110)
CO2 SERPL-SCNC: 26 MMOL/L (ref 23–29)
CREAT SERPL-MCNC: 1.2 MG/DL (ref 0.5–1.4)
EST. GFR  (AFRICAN AMERICAN): >60 ML/MIN/1.73 M^2
EST. GFR  (NON AFRICAN AMERICAN): >60 ML/MIN/1.73 M^2
GLUCOSE SERPL-MCNC: 262 MG/DL (ref 70–110)
POTASSIUM SERPL-SCNC: 4.3 MMOL/L (ref 3.5–5.1)
SODIUM SERPL-SCNC: 136 MMOL/L (ref 136–145)

## 2022-05-27 PROCEDURE — 36415 COLL VENOUS BLD VENIPUNCTURE: CPT | Mod: S$GLB,,, | Performed by: INTERNAL MEDICINE

## 2022-05-27 PROCEDURE — 80048 BASIC METABOLIC PNL TOTAL CA: CPT | Performed by: INTERNAL MEDICINE

## 2022-05-27 PROCEDURE — 36415 PR COLLECTION VENOUS BLOOD,VENIPUNCTURE: ICD-10-PCS | Mod: S$GLB,,, | Performed by: INTERNAL MEDICINE

## 2022-07-07 PROBLEM — U07.1 COVID: Status: ACTIVE | Noted: 2022-07-07

## 2022-07-11 ENCOUNTER — PATIENT MESSAGE (OUTPATIENT)
Dept: INFUSION THERAPY | Facility: HOSPITAL | Age: 57
End: 2022-07-11
Payer: MEDICARE

## 2022-07-27 ENCOUNTER — LAB VISIT (OUTPATIENT)
Dept: FAMILY MEDICINE | Facility: CLINIC | Age: 57
End: 2022-07-27
Payer: MEDICARE

## 2022-07-27 DIAGNOSIS — I10 ESSENTIAL (PRIMARY) HYPERTENSION: Chronic | ICD-10-CM

## 2022-07-27 DIAGNOSIS — Z79.02 LONG TERM (CURRENT) USE OF ANTITHROMBOTICS/ANTIPLATELETS: ICD-10-CM

## 2022-07-27 DIAGNOSIS — I22.2 SUBSEQUENT NON-ST ELEVATION (NSTEMI) MYOCARDIAL INFARCTION: ICD-10-CM

## 2022-07-27 DIAGNOSIS — E78.00 HYPERCHOLESTEROLEMIA: Chronic | ICD-10-CM

## 2022-07-27 LAB
ALBUMIN SERPL BCP-MCNC: 3.9 G/DL (ref 3.5–5.2)
ALP SERPL-CCNC: 70 U/L (ref 55–135)
ALT SERPL W/O P-5'-P-CCNC: 29 U/L (ref 10–44)
ANION GAP SERPL CALC-SCNC: 9 MMOL/L (ref 8–16)
AST SERPL-CCNC: 26 U/L (ref 10–40)
BILIRUB SERPL-MCNC: 0.5 MG/DL (ref 0.1–1)
BUN SERPL-MCNC: 33 MG/DL (ref 6–20)
CALCIUM SERPL-MCNC: 9.6 MG/DL (ref 8.7–10.5)
CHLORIDE SERPL-SCNC: 108 MMOL/L (ref 95–110)
CHOLEST SERPL-MCNC: 134 MG/DL (ref 120–199)
CHOLEST/HDLC SERPL: 2.7 {RATIO} (ref 2–5)
CO2 SERPL-SCNC: 22 MMOL/L (ref 23–29)
CREAT SERPL-MCNC: 1.5 MG/DL (ref 0.5–1.4)
EST. GFR  (AFRICAN AMERICAN): 59.7 ML/MIN/1.73 M^2
EST. GFR  (NON AFRICAN AMERICAN): 51.7 ML/MIN/1.73 M^2
GLUCOSE SERPL-MCNC: 76 MG/DL (ref 70–110)
HDLC SERPL-MCNC: 50 MG/DL (ref 40–75)
HDLC SERPL: 37.3 % (ref 20–50)
HGB BLD-MCNC: 12.6 G/DL (ref 14–18)
LDLC SERPL CALC-MCNC: 75 MG/DL (ref 63–159)
NONHDLC SERPL-MCNC: 84 MG/DL
POTASSIUM SERPL-SCNC: 3.8 MMOL/L (ref 3.5–5.1)
PROT SERPL-MCNC: 7.2 G/DL (ref 6–8.4)
SODIUM SERPL-SCNC: 139 MMOL/L (ref 136–145)
TRIGL SERPL-MCNC: 45 MG/DL (ref 30–150)

## 2022-07-27 PROCEDURE — 80061 LIPID PANEL: CPT | Performed by: INTERNAL MEDICINE

## 2022-07-27 PROCEDURE — 85018 HEMOGLOBIN: CPT | Performed by: INTERNAL MEDICINE

## 2022-07-27 PROCEDURE — 36415 COLL VENOUS BLD VENIPUNCTURE: CPT | Mod: S$GLB,,, | Performed by: INTERNAL MEDICINE

## 2022-07-27 PROCEDURE — 80053 COMPREHEN METABOLIC PANEL: CPT | Performed by: INTERNAL MEDICINE

## 2022-07-27 PROCEDURE — 36415 PR COLLECTION VENOUS BLOOD,VENIPUNCTURE: ICD-10-PCS | Mod: S$GLB,,, | Performed by: INTERNAL MEDICINE

## 2022-08-01 ENCOUNTER — OFFICE VISIT (OUTPATIENT)
Dept: CARDIOLOGY | Facility: CLINIC | Age: 57
End: 2022-08-01
Payer: MEDICARE

## 2022-08-01 VITALS
WEIGHT: 178.81 LBS | DIASTOLIC BLOOD PRESSURE: 75 MMHG | BODY MASS INDEX: 25.6 KG/M2 | HEIGHT: 70 IN | SYSTOLIC BLOOD PRESSURE: 111 MMHG | HEART RATE: 59 BPM

## 2022-08-01 DIAGNOSIS — N18.2 CHRONIC KIDNEY DISEASE, STAGE 2, MILDLY DECREASED GFR: Chronic | ICD-10-CM

## 2022-08-01 DIAGNOSIS — Z79.02 LONG TERM (CURRENT) USE OF ANTITHROMBOTICS/ANTIPLATELETS: Chronic | ICD-10-CM

## 2022-08-01 DIAGNOSIS — I25.10 CORONARY ARTERY DISEASE INVOLVING NATIVE CORONARY ARTERY OF NATIVE HEART WITHOUT ANGINA PECTORIS: Primary | Chronic | ICD-10-CM

## 2022-08-01 DIAGNOSIS — E66.3 OVERWEIGHT (BMI 25.0-29.9): Chronic | ICD-10-CM

## 2022-08-01 DIAGNOSIS — I10 ESSENTIAL (PRIMARY) HYPERTENSION: Chronic | ICD-10-CM

## 2022-08-01 DIAGNOSIS — E78.00 HYPERCHOLESTEROLEMIA: Chronic | ICD-10-CM

## 2022-08-01 PROCEDURE — 99214 PR OFFICE/OUTPT VISIT, EST, LEVL IV, 30-39 MIN: ICD-10-PCS | Mod: S$GLB,,, | Performed by: INTERNAL MEDICINE

## 2022-08-01 PROCEDURE — 99214 OFFICE O/P EST MOD 30 MIN: CPT | Mod: S$GLB,,, | Performed by: INTERNAL MEDICINE

## 2022-08-01 PROCEDURE — 3052F HG A1C>EQUAL 8.0%<EQUAL 9.0%: CPT | Mod: CPTII,S$GLB,, | Performed by: INTERNAL MEDICINE

## 2022-08-01 PROCEDURE — 3008F PR BODY MASS INDEX (BMI) DOCUMENTED: ICD-10-PCS | Mod: CPTII,S$GLB,, | Performed by: INTERNAL MEDICINE

## 2022-08-01 PROCEDURE — 3008F BODY MASS INDEX DOCD: CPT | Mod: CPTII,S$GLB,, | Performed by: INTERNAL MEDICINE

## 2022-08-01 PROCEDURE — 3074F PR MOST RECENT SYSTOLIC BLOOD PRESSURE < 130 MM HG: ICD-10-PCS | Mod: CPTII,S$GLB,, | Performed by: INTERNAL MEDICINE

## 2022-08-01 PROCEDURE — 3074F SYST BP LT 130 MM HG: CPT | Mod: CPTII,S$GLB,, | Performed by: INTERNAL MEDICINE

## 2022-08-01 PROCEDURE — 3052F PR MOST RECENT HEMOGLOBIN A1C LEVEL 8.0 - < 9.0%: ICD-10-PCS | Mod: CPTII,S$GLB,, | Performed by: INTERNAL MEDICINE

## 2022-08-01 PROCEDURE — 99999 PR PBB SHADOW E&M-EST. PATIENT-LVL II: CPT | Mod: PBBFAC,,, | Performed by: INTERNAL MEDICINE

## 2022-08-01 PROCEDURE — 4010F ACE/ARB THERAPY RXD/TAKEN: CPT | Mod: CPTII,S$GLB,, | Performed by: INTERNAL MEDICINE

## 2022-08-01 PROCEDURE — 99212 OFFICE O/P EST SF 10 MIN: CPT | Mod: PBBFAC,PO | Performed by: INTERNAL MEDICINE

## 2022-08-01 PROCEDURE — 3078F PR MOST RECENT DIASTOLIC BLOOD PRESSURE < 80 MM HG: ICD-10-PCS | Mod: CPTII,S$GLB,, | Performed by: INTERNAL MEDICINE

## 2022-08-01 PROCEDURE — 4010F PR ACE/ARB THEARPY RXD/TAKEN: ICD-10-PCS | Mod: CPTII,S$GLB,, | Performed by: INTERNAL MEDICINE

## 2022-08-01 PROCEDURE — 3078F DIAST BP <80 MM HG: CPT | Mod: CPTII,S$GLB,, | Performed by: INTERNAL MEDICINE

## 2022-08-01 PROCEDURE — 99999 PR PBB SHADOW E&M-EST. PATIENT-LVL II: ICD-10-PCS | Mod: PBBFAC,,, | Performed by: INTERNAL MEDICINE

## 2022-08-01 RX ORDER — CARVEDILOL 6.25 MG/1
6.25 TABLET ORAL 2 TIMES DAILY
Qty: 180 TABLET | Refills: 1 | Status: SHIPPED | OUTPATIENT
Start: 2022-08-01 | End: 2022-08-31

## 2022-08-01 NOTE — PROGRESS NOTES
Subjective:    Patient ID:  Ramón Reid is a 56 y.o. male who presents for Hypertension, Hyperlipidemia, and Coronary Artery Disease        HPI  DISCUSSED  LABS AND GOALS, LDL 75 HDL 50, TRIGLYCERIDE 45, CMP GFR 59.7, HEMOGLOBIN 12.6, BP OK, HAS BEEN HAVING BACK PAIN, SEE ROS  Past Medical History:   Diagnosis Date    Bell's palsy     Diabetes mellitus     Hypertension      Past Surgical History:   Procedure Laterality Date    CORONARY ANGIOGRAPHY N/A 1/24/2022    Procedure: ANGIOGRAM, CORONARY ARTERY;  Surgeon: Brianda Drake MD;  Location: [facility];  Service: Cardiology;  Laterality: N/A;    CORONARY STENT PLACEMENT N/A 1/24/2022    Procedure: INSERTION, STENT, CORONARY ARTERY;  Surgeon: Brianda Drake MD;  Location: [facility];  Service: Cardiology;  Laterality: N/A;    HERNIA REPAIR      LEFT HEART CATHETERIZATION Left 1/24/2022    Procedure: Left heart cath;  Surgeon: Brianda Drake MD;  Location: [facility];  Service: Cardiology;  Laterality: Left;     No family history on file.  Social History     Socioeconomic History    Marital status:    Tobacco Use    Smoking status: Never Smoker    Smokeless tobacco: Never Used   Substance and Sexual Activity    Alcohol use: Not Currently       Review of patient's allergies indicates:  No Known Allergies    Current Outpatient Medications:     amLODIPine (NORVASC) 5 MG tablet, Take 1 tablet (5 mg total) by mouth once daily., Disp: 90 tablet, Rfl: 0    atorvastatin (LIPITOR) 20 MG tablet, Take 1 tablet (20 mg total) by mouth every evening., Disp: 90 tablet, Rfl: 1    blood sugar diagnostic Strp, To check BG 2-3 times daily, to use with insurance preferred meter, Disp: 150 each, Rfl: 1    blood-glucose meter kit, To check BG 2-3 times daily, to use with insurance preferred meter, Disp: 1 each, Rfl: 1    ferrous sulfate (FEOSOL) Tab tablet, Take 1 tablet by mouth daily with breakfast., Disp: , Rfl:     gabapentin (NEURONTIN) 400 MG capsule, Take  "400 mg by mouth 3 (three) times daily., Disp: , Rfl:     glimepiride (AMARYL) 2 MG tablet, Take 2 mg by mouth once daily., Disp: , Rfl:     lancets Misc, To check BG 2-3 times daily, to use with insurance preferred meter, Disp: 150 each, Rfl: 1    LANTUS SOLOSTAR U-100 INSULIN glargine 100 units/mL (3mL) SubQ pen, Inject 20 Units into the skin every evening., Disp: , Rfl:     lisinopriL (PRINIVIL,ZESTRIL) 20 MG tablet, Take 1 tablet (20 mg total) by mouth 2 (two) times a day., Disp: 60 tablet, Rfl: 0    lisinopriL (PRINIVIL,ZESTRIL) 20 MG tablet, TAKE 1 TABLET BY MOUTH TWICE A DAY, Disp: 180 tablet, Rfl: 0    pen needle, diabetic 32 gauge x 5/16" Ndle, To be used with insulin injections, Disp: 100 each, Rfl: 1    sildenafiL (VIAGRA) 100 MG tablet, Take 1 tablet (100 mg total) by mouth daily as needed for Erectile Dysfunction., Disp: 10 tablet, Rfl: 1    vitamin D (VITAMIN D3) 1000 units Tab, Take 1,000 Units by mouth once daily., Disp: , Rfl:     aspirin (ECOTRIN) 81 MG EC tablet, Take 1 tablet (81 mg total) by mouth once daily., Disp: 30 tablet, Rfl: 1    carvediloL (COREG) 6.25 MG tablet, Take 1 tablet (6.25 mg total) by mouth 2 (two) times daily., Disp: 180 tablet, Rfl: 1    clopidogreL (PLAVIX) 75 mg tablet, Take 1 tablet (75 mg total) by mouth once daily., Disp: 90 tablet, Rfl: 1    multivitamin capsule, Take 1 capsule by mouth once daily., Disp: , Rfl:     Review of Systems   Constitutional: Negative for chills, diaphoresis, malaise/fatigue and night sweats.   HENT: Negative for congestion and nosebleeds.    Eyes: Negative.    Cardiovascular: Negative for chest pain, claudication, cyanosis, dyspnea on exertion, irregular heartbeat, leg swelling, near-syncope, orthopnea, palpitations, paroxysmal nocturnal dyspnea and syncope.   Respiratory: Negative for cough, hemoptysis and shortness of breath.    Endocrine: Negative.    Hematologic/Lymphatic: Negative for adenopathy. Does not bruise/bleed easily. " "  Skin: Negative for color change and rash.   Musculoskeletal: Positive for back pain. Negative for falls.   Gastrointestinal: Negative for abdominal pain, change in bowel habit, dysphagia, heartburn, melena, nausea and vomiting.   Genitourinary: Negative.  Negative for dysuria and flank pain.   Neurological: Negative for brief paralysis, focal weakness, headaches, light-headedness, loss of balance, numbness, paresthesias and weakness.   Psychiatric/Behavioral: Negative for altered mental status and depression.   Allergic/Immunologic: Negative for hives and persistent infections.        Objective:      Vitals:    08/01/22 1435   BP: 111/75   Pulse: (!) 59   Weight: 81.1 kg (178 lb 12.7 oz)   Height: 5' 10" (1.778 m)   PainSc: 10-Worst pain ever   PainLoc: Back     Body mass index is 25.65 kg/m².    Physical Exam  Constitutional:       Appearance: Normal appearance.   HENT:      Head: Normocephalic and atraumatic.      Mouth/Throat:      Mouth: Mucous membranes are dry.   Eyes:      Conjunctiva/sclera: Conjunctivae normal.   Neck:      Vascular: No carotid bruit.   Cardiovascular:      Rate and Rhythm: Regular rhythm. Bradycardia present.      Pulses:           Carotid pulses are 2+ on the right side and 2+ on the left side.       Radial pulses are 2+ on the right side and 2+ on the left side.        Posterior tibial pulses are 2+ on the right side and 2+ on the left side.      Heart sounds: No murmur heard.    No friction rub. No gallop. No S4 sounds.   Pulmonary:      Breath sounds: No rales.   Abdominal:      Palpations: Abdomen is soft.      Tenderness: There is no abdominal tenderness.   Musculoskeletal:      Cervical back: Neck supple.      Right lower leg: No edema.      Left lower leg: No edema.   Skin:     General: Skin is warm and dry.      Capillary Refill: Capillary refill takes less than 2 seconds.   Neurological:      General: No focal deficit present.      Mental Status: He is alert and oriented to " person, place, and time.      Cranial Nerves: No cranial nerve deficit.   Psychiatric:         Mood and Affect: Mood normal.         Behavior: Behavior normal.                 ..    Chemistry        Component Value Date/Time     07/27/2022 0920    K 3.8 07/27/2022 0920     07/27/2022 0920    CO2 22 (L) 07/27/2022 0920    BUN 33 (H) 07/27/2022 0920    CREATININE 1.5 (H) 07/27/2022 0920    GLU 76 07/27/2022 0920        Component Value Date/Time    CALCIUM 9.6 07/27/2022 0920    ALKPHOS 70 07/27/2022 0920    AST 26 07/27/2022 0920    ALT 29 07/27/2022 0920    BILITOT 0.5 07/27/2022 0920    ESTGFRAFRICA 59.7 (A) 07/27/2022 0920    EGFRNONAA 51.7 (A) 07/27/2022 0920            ..  Lab Results   Component Value Date    CHOL 134 07/27/2022    CHOL 142 05/10/2022    CHOL 178 01/22/2022     Lab Results   Component Value Date    HDL 50 07/27/2022    HDL 39 (L) 05/10/2022    HDL 48 01/22/2022     Lab Results   Component Value Date    LDLCALC 75.0 07/27/2022    LDLCALC 89.0 05/10/2022    LDLCALC 107.2 01/22/2022     Lab Results   Component Value Date    TRIG 45 07/27/2022    TRIG 70 05/10/2022    TRIG 114 01/22/2022     Lab Results   Component Value Date    CHOLHDL 37.3 07/27/2022    CHOLHDL 27.5 05/10/2022    CHOLHDL 27.0 01/22/2022     ..  Lab Results   Component Value Date    WBC 4.94 07/07/2022    HGB 12.6 (L) 07/27/2022    HCT 38.6 (L) 07/07/2022    MCV 72 (L) 07/07/2022     07/07/2022       Test(s) Reviewed  I have reviewed the following in detail:  [] Stress test   [] Angiography   [] Echocardiogram   [x] Labs   [] Other:       Assessment:         ICD-10-CM ICD-9-CM   1. Coronary artery disease involving native coronary artery of native heart without angina pectoris  I25.10 414.01   2. Long term (current) use of antithrombotics/antiplatelets  Z79.02 V58.63   3. Essential (primary) hypertension  I10 401.9   4. Overweight (BMI 25.0-29.9)  E66.3 278.02   5. Hypercholesterolemia  E78.00 272.0   6. Chronic  kidney disease, stage 2, mildly decreased GFR  N18.2 585.2     Problem List Items Addressed This Visit        Cardiac/Vascular    Essential (primary) hypertension    Relevant Orders    Comprehensive Metabolic Panel    Hypercholesterolemia    Relevant Orders    Comprehensive Metabolic Panel    Coronary artery disease involving native coronary artery of native heart without angina pectoris - Primary    Relevant Orders    Comprehensive Metabolic Panel       Renal/    Chronic kidney disease, stage 2, mildly decreased GFR    Relevant Orders    Comprehensive Metabolic Panel       Hematology    Long term (current) use of antithrombotics/antiplatelets    Relevant Orders    Comprehensive Metabolic Panel       Endocrine    Overweight (BMI 25.0-29.9)           Plan:         DAILY MEDS, OK FOR BACK SURGERY, HOLD PLAVIX 5-7 DAYS, HYDRATION, ALL CV CLINICALLY STABLE, NO ANGINA, NO HF, NO TIA, NO CLINICAL ARRHYTHMIA,CONTINUE CURRENT MEDS, EDUCATION, DIET, EXERCISE, DISCUSSED WITH PT AND GIRLFRIEND, RTC IN 6 MO WITH LABS  Coronary artery disease involving native coronary artery of native heart without angina pectoris  -     Comprehensive Metabolic Panel; Future; Expected date: 02/01/2023    Long term (current) use of antithrombotics/antiplatelets  -     Comprehensive Metabolic Panel; Future; Expected date: 02/01/2023    Essential (primary) hypertension  -     Comprehensive Metabolic Panel; Future; Expected date: 02/01/2023    Overweight (BMI 25.0-29.9)    Hypercholesterolemia  -     Comprehensive Metabolic Panel; Future; Expected date: 02/01/2023    Chronic kidney disease, stage 2, mildly decreased GFR  -     Comprehensive Metabolic Panel; Future; Expected date: 02/01/2023    Other orders  -     carvediloL (COREG) 6.25 MG tablet; Take 1 tablet (6.25 mg total) by mouth 2 (two) times daily.  Dispense: 180 tablet; Refill: 1    RTC Low level/low impact aerobic exercise 5x's/wk. Heart healthy diet and risk factor modification.    See  labs and med orders.    Aerobic exercise 5x's/wk. Heart healthy diet and risk factor modification.    See labs and med orders.

## 2022-08-25 ENCOUNTER — OFFICE VISIT (OUTPATIENT)
Dept: PODIATRY | Facility: CLINIC | Age: 57
End: 2022-08-25
Payer: MEDICARE

## 2022-08-25 VITALS — HEIGHT: 70 IN | WEIGHT: 178.81 LBS | BODY MASS INDEX: 25.6 KG/M2

## 2022-08-25 DIAGNOSIS — M77.41 METATARSALGIA OF BOTH FEET: ICD-10-CM

## 2022-08-25 DIAGNOSIS — L90.9 FAT PAD ATROPHY OF FOOT: ICD-10-CM

## 2022-08-25 DIAGNOSIS — E11.51 TYPE II DIABETES MELLITUS WITH PERIPHERAL CIRCULATORY DISORDER: Primary | ICD-10-CM

## 2022-08-25 DIAGNOSIS — M77.42 METATARSALGIA OF BOTH FEET: ICD-10-CM

## 2022-08-25 PROCEDURE — 3008F PR BODY MASS INDEX (BMI) DOCUMENTED: ICD-10-PCS | Mod: CPTII,S$GLB,, | Performed by: STUDENT IN AN ORGANIZED HEALTH CARE EDUCATION/TRAINING PROGRAM

## 2022-08-25 PROCEDURE — 99203 OFFICE O/P NEW LOW 30 MIN: CPT | Mod: S$GLB,,, | Performed by: STUDENT IN AN ORGANIZED HEALTH CARE EDUCATION/TRAINING PROGRAM

## 2022-08-25 PROCEDURE — 99203 PR OFFICE/OUTPT VISIT, NEW, LEVL III, 30-44 MIN: ICD-10-PCS | Mod: S$GLB,,, | Performed by: STUDENT IN AN ORGANIZED HEALTH CARE EDUCATION/TRAINING PROGRAM

## 2022-08-25 PROCEDURE — 4010F PR ACE/ARB THEARPY RXD/TAKEN: ICD-10-PCS | Mod: CPTII,S$GLB,, | Performed by: STUDENT IN AN ORGANIZED HEALTH CARE EDUCATION/TRAINING PROGRAM

## 2022-08-25 PROCEDURE — 3052F HG A1C>EQUAL 8.0%<EQUAL 9.0%: CPT | Mod: CPTII,S$GLB,, | Performed by: STUDENT IN AN ORGANIZED HEALTH CARE EDUCATION/TRAINING PROGRAM

## 2022-08-25 PROCEDURE — 1159F MED LIST DOCD IN RCRD: CPT | Mod: CPTII,S$GLB,, | Performed by: STUDENT IN AN ORGANIZED HEALTH CARE EDUCATION/TRAINING PROGRAM

## 2022-08-25 PROCEDURE — 3008F BODY MASS INDEX DOCD: CPT | Mod: CPTII,S$GLB,, | Performed by: STUDENT IN AN ORGANIZED HEALTH CARE EDUCATION/TRAINING PROGRAM

## 2022-08-25 PROCEDURE — 3052F PR MOST RECENT HEMOGLOBIN A1C LEVEL 8.0 - < 9.0%: ICD-10-PCS | Mod: CPTII,S$GLB,, | Performed by: STUDENT IN AN ORGANIZED HEALTH CARE EDUCATION/TRAINING PROGRAM

## 2022-08-25 PROCEDURE — 1159F PR MEDICATION LIST DOCUMENTED IN MEDICAL RECORD: ICD-10-PCS | Mod: CPTII,S$GLB,, | Performed by: STUDENT IN AN ORGANIZED HEALTH CARE EDUCATION/TRAINING PROGRAM

## 2022-08-25 PROCEDURE — 1160F PR REVIEW ALL MEDS BY PRESCRIBER/CLIN PHARMACIST DOCUMENTED: ICD-10-PCS | Mod: CPTII,S$GLB,, | Performed by: STUDENT IN AN ORGANIZED HEALTH CARE EDUCATION/TRAINING PROGRAM

## 2022-08-25 PROCEDURE — 99999 PR PBB SHADOW E&M-EST. PATIENT-LVL II: CPT | Mod: PBBFAC,,, | Performed by: STUDENT IN AN ORGANIZED HEALTH CARE EDUCATION/TRAINING PROGRAM

## 2022-08-25 PROCEDURE — 1160F RVW MEDS BY RX/DR IN RCRD: CPT | Mod: CPTII,S$GLB,, | Performed by: STUDENT IN AN ORGANIZED HEALTH CARE EDUCATION/TRAINING PROGRAM

## 2022-08-25 PROCEDURE — 4010F ACE/ARB THERAPY RXD/TAKEN: CPT | Mod: CPTII,S$GLB,, | Performed by: STUDENT IN AN ORGANIZED HEALTH CARE EDUCATION/TRAINING PROGRAM

## 2022-08-25 PROCEDURE — 99999 PR PBB SHADOW E&M-EST. PATIENT-LVL II: ICD-10-PCS | Mod: PBBFAC,,, | Performed by: STUDENT IN AN ORGANIZED HEALTH CARE EDUCATION/TRAINING PROGRAM

## 2022-08-25 RX ORDER — AMMONIUM LACTATE 12 G/100G
LOTION TOPICAL
Qty: 396 G | Refills: 11 | Status: SHIPPED | OUTPATIENT
Start: 2022-08-25

## 2022-08-25 RX ORDER — KETOCONAZOLE 20 MG/G
CREAM TOPICAL DAILY
Qty: 60 G | Refills: 3 | Status: SHIPPED | OUTPATIENT
Start: 2022-08-25 | End: 2023-02-21

## 2022-08-25 NOTE — PROGRESS NOTES
Chief Complaint   Patient presents with    Foot Pain     Brody foot pain              MEDICAL DECISION MAKING:        ICD-10-CM ICD-9-CM    1. Type II diabetes mellitus with peripheral circulatory disorder  E11.51 250.70 DIABETIC SHOES FOR HOME USE     443.81    2. Fat pad atrophy of foot  L90.9 701.9 DIABETIC SHOES FOR HOME USE            Patient was evaluated and risk assessed today(08/25/2022). The patient is at moderate risk for developing pedal complications. I explained to the patient that peripheral vascular disease can make healing injuries difficult leading to wounds or gangrene.   In general, I recommend monitoring the feet daily for any areas of pressure or injuries. If any areas appear to be healing slowly or become infected, seek medical attention as soon as possible. Wear supportive shoes and avoid barefoot walking.    Shoe inspection.      Continue good nutrition and blood sugar control to help prevent podiatric complications of diabetes.    Maintain proper foot hygiene.    Continue wearing proper shoe gear, daily foot inspections, never walking without protective shoe gear, never putting sharp instruments to feet.   Patient has interdigital maceration with associated tinea pedis bilateral that is causing him pain.  Recommend treatment with gentian violet daily for about 2 weeks.  Prescription given for ketoconazole to apply to the rest of the foot.  Amlactin prescribed for general moisturization.   Metatarsal pads given for tenderness to the MTPJ bilateral.  Gentian violet was applied to the interspaces of both feet.   Prescription given for diabetic shoes.   Follow up in 1 year                HPI:   The patient is a 56 y.o.  male  who presents for a diabetic foot exam.     Patient reports some burning numbness and tingling to his feet.  He denies any cramping or deep aching when he walks.  He has complaints of pain to the forefoot on both sides.  He has some concerns of weird stuff going on  in between his toes on both feet.  He wears shoes and socks all day daily.    The [facility] qualifying diagnosis of diabetes mellitus with PAD.  Patient was last seen on Foot Pain (Brody foot pain)  .          Patient Active Problem List   Diagnosis    NSTEMI (non-ST elevated myocardial infarction)    Type 2 diabetes mellitus with hyperglycemia, without long-term current use of insulin    Essential (primary) hypertension    Pulmonary edema cardiac cause    Chest tightness    Elevated troponin    SOB (shortness of breath)    Subsequent non-ST elevation (NSTEMI) myocardial infarction    Long term (current) use of antithrombotics/antiplatelets    Other male erectile dysfunction    Hypercholesterolemia    Overweight (BMI 25.0-29.9)    Coronary artery disease involving native coronary artery of native heart without angina pectoris    Elevated serum creatinine    COVID    Chronic kidney disease, stage 2, mildly decreased GFR           Current Outpatient Medications on File Prior to Visit   Medication Sig Dispense Refill    amLODIPine (NORVASC) 5 MG tablet Take 1 tablet (5 mg total) by mouth once daily. 90 tablet 0    atorvastatin (LIPITOR) 20 MG tablet Take 1 tablet (20 mg total) by mouth every evening. 90 tablet 1    blood sugar diagnostic Strp To check BG 2-3 times daily, to use with insurance preferred meter 150 each 1    blood-glucose meter kit To check BG 2-3 times daily, to use with insurance preferred meter 1 each 1    carvediloL (COREG) 6.25 MG tablet Take 1 tablet (6.25 mg total) by mouth 2 (two) times daily. 180 tablet 1    ferrous sulfate (FEOSOL) Tab tablet Take 1 tablet by mouth daily with breakfast.      gabapentin (NEURONTIN) 400 MG capsule Take 400 mg by mouth 3 (three) times daily.      glimepiride (AMARYL) 2 MG tablet Take 2 mg by mouth once daily.      lancets Misc To check BG 2-3 times daily, to use with insurance  "preferred meter 150 each 1    LANTUS SOLOSTAR U-100 INSULIN glargine 100 units/mL (3mL) SubQ pen Inject 20 Units into the skin every evening.      lisinopriL (PRINIVIL,ZESTRIL) 20 MG tablet Take 1 tablet (20 mg total) by mouth 2 (two) times a day. 60 tablet 0    lisinopriL (PRINIVIL,ZESTRIL) 20 MG tablet TAKE 1 TABLET BY MOUTH TWICE A  tablet 0    multivitamin capsule Take 1 capsule by mouth once daily.      pen needle, diabetic 32 gauge x 5/16" Ndle To be used with insulin injections 100 each 1    sildenafiL (VIAGRA) 100 MG tablet Take 1 tablet (100 mg total) by mouth daily as needed for Erectile Dysfunction. 10 tablet 1    vitamin D (VITAMIN D3) 1000 units Tab Take 1,000 Units by mouth once daily.      aspirin (ECOTRIN) 81 MG EC tablet Take 1 tablet (81 mg total) by mouth once daily. 30 tablet 1    clopidogreL (PLAVIX) 75 mg tablet Take 1 tablet (75 mg total) by mouth once daily. 90 tablet 1     No current facility-administered medications on file prior to visit.           Review of patient's allergies indicates:  No Known Allergies          ROS:  General ROS: negative  Respiratory ROS: no cough, shortness of breath, or wheezing  Cardiovascular ROS: no chest pain or dyspnea on exertion  Musculoskeletal ROS: negative  Neurological ROS:   positive for - numbness/tingling  Dermatological ROS: negative      LAST HbA1c:   Lab Results   Component Value Date    HGBA1C 8.4 (H) 01/22/2022           EXAM:   Vitals:    08/25/22 1415   Weight: 81.1 kg (178 lb 12.7 oz)   Height: 5' 10" (1.778 m)       General: healthy    Vascular:    Dorsalis Pedis:  absent      Posterior Tibial:  absent   Capillary refill time:  3 seconds   Temperature of toes cool to touch   Edema: Absent bilaterally      Neurological:      Sharp touch:  normal   Light touch: normal   Tinels Sign:  Absent   [place]:  Absent deficits to sharp/dull, light touch or vibratory sensation feet, ten points tested.    Present paresthesias " (Abnormal spontaneous sensations in feet)        Dermatological:    Skin: atrophic   Hair growth:  decreased   Wounds/Ulcers:  Absent   Bruising:  Absent   Erythema:  Absent   Toenails:   thickness:  thickened;   Brownish in color,  with subungual debris.   Absent paronychia   Hyperkeratotic lesions to the toes   There is severe interdigital maceration of the interspaces of both feet.      Musculoskeletal:    + tenderness to palpation MTPJs R foot and interspaces of b/l forefoot   ROM is full without pain or crepitation   Bunions:  Present   Hammertoes: Absent

## 2022-11-11 ENCOUNTER — TELEPHONE (OUTPATIENT)
Dept: CARDIOLOGY | Facility: CLINIC | Age: 57
End: 2022-11-11
Payer: MEDICARE

## 2022-11-11 NOTE — TELEPHONE ENCOUNTER
Spoke with pt and scheduled for next week. Advised pt if chest pain continues and has SOB go to ED. Pt ENDY

## 2022-11-11 NOTE — TELEPHONE ENCOUNTER
----- Message from Deborah Bess sent at 11/11/2022 10:28 AM CST -----  Type: Needs Medical Advice         Who Called: pt wife   Best Call Back Number:[phone]  Additional Information: Requesting a call back regarding pt has had on and off tightness, pressure and pain in his chest for the past mth. Pt has appt in December but asking if he can be seen sooner.   Please Advise- Thank you

## 2022-11-17 ENCOUNTER — OFFICE VISIT (OUTPATIENT)
Dept: CARDIOLOGY | Facility: CLINIC | Age: 57
End: 2022-11-17
Payer: MEDICARE

## 2022-11-17 VITALS
HEART RATE: 90 BPM | BODY MASS INDEX: 24.61 KG/M2 | SYSTOLIC BLOOD PRESSURE: 148 MMHG | HEIGHT: 70 IN | WEIGHT: 171.94 LBS | DIASTOLIC BLOOD PRESSURE: 88 MMHG

## 2022-11-17 DIAGNOSIS — N18.2 CHRONIC KIDNEY DISEASE, STAGE 2, MILDLY DECREASED GFR: Chronic | ICD-10-CM

## 2022-11-17 DIAGNOSIS — Z91.148 NON COMPLIANCE W MEDICATION REGIMEN: Chronic | ICD-10-CM

## 2022-11-17 DIAGNOSIS — E66.3 OVERWEIGHT (BMI 25.0-29.9): Chronic | ICD-10-CM

## 2022-11-17 DIAGNOSIS — R07.89 ATYPICAL CHEST PAIN: ICD-10-CM

## 2022-11-17 DIAGNOSIS — I10 ESSENTIAL (PRIMARY) HYPERTENSION: Primary | ICD-10-CM

## 2022-11-17 DIAGNOSIS — I25.10 CORONARY ARTERY DISEASE INVOLVING NATIVE CORONARY ARTERY OF NATIVE HEART WITHOUT ANGINA PECTORIS: Chronic | ICD-10-CM

## 2022-11-17 DIAGNOSIS — E78.00 HYPERCHOLESTEROLEMIA: Chronic | ICD-10-CM

## 2022-11-17 DIAGNOSIS — Z79.02 LONG TERM (CURRENT) USE OF ANTITHROMBOTICS/ANTIPLATELETS: Chronic | ICD-10-CM

## 2022-11-17 PROCEDURE — 99214 OFFICE O/P EST MOD 30 MIN: CPT | Mod: S$GLB,,, | Performed by: INTERNAL MEDICINE

## 2022-11-17 PROCEDURE — 4010F PR ACE/ARB THEARPY RXD/TAKEN: ICD-10-PCS | Mod: CPTII,S$GLB,, | Performed by: INTERNAL MEDICINE

## 2022-11-17 PROCEDURE — 3052F PR MOST RECENT HEMOGLOBIN A1C LEVEL 8.0 - < 9.0%: ICD-10-PCS | Mod: CPTII,S$GLB,, | Performed by: INTERNAL MEDICINE

## 2022-11-17 PROCEDURE — 3052F HG A1C>EQUAL 8.0%<EQUAL 9.0%: CPT | Mod: CPTII,S$GLB,, | Performed by: INTERNAL MEDICINE

## 2022-11-17 PROCEDURE — 3079F DIAST BP 80-89 MM HG: CPT | Mod: CPTII,S$GLB,, | Performed by: INTERNAL MEDICINE

## 2022-11-17 PROCEDURE — 3079F PR MOST RECENT DIASTOLIC BLOOD PRESSURE 80-89 MM HG: ICD-10-PCS | Mod: CPTII,S$GLB,, | Performed by: INTERNAL MEDICINE

## 2022-11-17 PROCEDURE — 3077F SYST BP >= 140 MM HG: CPT | Mod: CPTII,S$GLB,, | Performed by: INTERNAL MEDICINE

## 2022-11-17 PROCEDURE — 3008F BODY MASS INDEX DOCD: CPT | Mod: CPTII,S$GLB,, | Performed by: INTERNAL MEDICINE

## 2022-11-17 PROCEDURE — 4010F ACE/ARB THERAPY RXD/TAKEN: CPT | Mod: CPTII,S$GLB,, | Performed by: INTERNAL MEDICINE

## 2022-11-17 PROCEDURE — 99999 PR PBB SHADOW E&M-EST. PATIENT-LVL III: ICD-10-PCS | Mod: PBBFAC,,, | Performed by: INTERNAL MEDICINE

## 2022-11-17 PROCEDURE — 99214 PR OFFICE/OUTPT VISIT, EST, LEVL IV, 30-39 MIN: ICD-10-PCS | Mod: S$GLB,,, | Performed by: INTERNAL MEDICINE

## 2022-11-17 PROCEDURE — 99999 PR PBB SHADOW E&M-EST. PATIENT-LVL III: CPT | Mod: PBBFAC,,, | Performed by: INTERNAL MEDICINE

## 2022-11-17 PROCEDURE — 1159F PR MEDICATION LIST DOCUMENTED IN MEDICAL RECORD: ICD-10-PCS | Mod: CPTII,S$GLB,, | Performed by: INTERNAL MEDICINE

## 2022-11-17 PROCEDURE — 3008F PR BODY MASS INDEX (BMI) DOCUMENTED: ICD-10-PCS | Mod: CPTII,S$GLB,, | Performed by: INTERNAL MEDICINE

## 2022-11-17 PROCEDURE — 1159F MED LIST DOCD IN RCRD: CPT | Mod: CPTII,S$GLB,, | Performed by: INTERNAL MEDICINE

## 2022-11-17 PROCEDURE — 3077F PR MOST RECENT SYSTOLIC BLOOD PRESSURE >= 140 MM HG: ICD-10-PCS | Mod: CPTII,S$GLB,, | Performed by: INTERNAL MEDICINE

## 2022-11-17 RX ORDER — AMOXICILLIN 500 MG/1
500 CAPSULE ORAL 3 TIMES DAILY
Status: ON HOLD | COMMUNITY
Start: 2022-11-14 | End: 2023-06-15 | Stop reason: HOSPADM

## 2022-11-17 RX ORDER — LISINOPRIL 20 MG/1
20 TABLET ORAL 2 TIMES DAILY
Qty: 180 TABLET | Refills: 0 | Status: SHIPPED | OUTPATIENT
Start: 2022-11-17 | End: 2022-12-19

## 2022-11-17 RX ORDER — HYDROGEN PEROXIDE 3 %
20 SOLUTION, NON-ORAL MISCELLANEOUS
COMMUNITY

## 2022-11-17 RX ORDER — CARVEDILOL 6.25 MG/1
6.25 TABLET ORAL 2 TIMES DAILY WITH MEALS
Qty: 180 TABLET | Refills: 1 | Status: SHIPPED | OUTPATIENT
Start: 2022-11-17 | End: 2023-05-31

## 2022-11-17 RX ORDER — METHOCARBAMOL 750 MG/1
750 TABLET, FILM COATED ORAL 3 TIMES DAILY
COMMUNITY
Start: 2022-09-21

## 2022-11-17 RX ORDER — ATORVASTATIN CALCIUM 20 MG/1
20 TABLET, FILM COATED ORAL NIGHTLY
Qty: 90 TABLET | Refills: 1 | Status: SHIPPED | OUTPATIENT
Start: 2022-11-17

## 2022-11-17 RX ORDER — OFLOXACIN 3 MG/ML
SOLUTION AURICULAR (OTIC)
Status: ON HOLD | COMMUNITY
Start: 2022-11-16 | End: 2023-06-15 | Stop reason: HOSPADM

## 2022-11-17 RX ORDER — CLOPIDOGREL BISULFATE 75 MG/1
75 TABLET ORAL DAILY
Qty: 90 TABLET | Refills: 1 | Status: SHIPPED | OUTPATIENT
Start: 2022-11-17

## 2022-11-17 NOTE — PROGRESS NOTES
Subjective:    Patient ID:  Ramón Reid is a 57 y.o. male who presents for Chest Pain        HPI    NO LABS, HAS BEEN HAVING INTERMITTENT CHEST PAIN, OFF MEDS, MILD, BP UP, RAN OUT OF BB, BP WAS OK, NEEDS DENTAL WORK, SEE ROS  Past Medical History:   Diagnosis Date    Bell's palsy     Diabetes mellitus     Hypertension      Past Surgical History:   Procedure Laterality Date    CORONARY ANGIOGRAPHY N/A 1/24/2022    Procedure: ANGIOGRAM, CORONARY ARTERY;  Surgeon: Brianda Drake MD;  Location: [facility] CATH;  Service: Cardiology;  Laterality: N/A;    CORONARY STENT PLACEMENT N/A 1/24/2022    Procedure: INSERTION, STENT, CORONARY ARTERY;  Surgeon: Brianda Drake MD;  Location: [facility] CATH;  Service: Cardiology;  Laterality: N/A;    HERNIA REPAIR      LEFT HEART CATHETERIZATION Left 1/24/2022    Procedure: Left heart cath;  Surgeon: Brianda Drake MD;  Location: [facility] CATH;  Service: Cardiology;  Laterality: Left;     No family history on file.  Social History     Socioeconomic History    Marital status:    Tobacco Use    Smoking status: Never    Smokeless tobacco: Never   Substance and Sexual Activity    Alcohol use: Not Currently       Review of patient's allergies indicates:  No Known Allergies    Current Outpatient Medications:     amLODIPine (NORVASC) 5 MG tablet, Take 1 tablet (5 mg total) by mouth once daily., Disp: 90 tablet, Rfl: 0    amoxicillin (AMOXIL) 500 MG capsule, Take 500 mg by mouth 3 (three) times daily., Disp: , Rfl:     aspirin (ECOTRIN) 81 MG EC tablet, Take 1 tablet (81 mg total) by mouth once daily., Disp: 30 tablet, Rfl: 1    esomeprazole (NEXIUM) 20 MG capsule, Take 20 mg by mouth before breakfast., Disp: , Rfl:     gabapentin (NEURONTIN) 400 MG capsule, Take 400 mg by mouth 3 (three) times daily., Disp: , Rfl:     glimepiride (AMARYL) 2 MG tablet, Take 2 mg by mouth once daily., Disp: , Rfl:     ketoconazole (NIZORAL) 2 % cream, Apply topically once daily., Disp: 60 g, Rfl: 3    LANTUS  "SOLOSTAR U-100 INSULIN glargine 100 units/mL (3mL) SubQ pen, Inject 20 Units into the skin every evening., Disp: , Rfl:     methocarbamoL (ROBAXIN) 750 MG Tab, Take 750 mg by mouth 3 (three) times daily., Disp: , Rfl:     ofloxacin (FLOXIN) 0.3 % otic solution, Place into both ears., Disp: , Rfl:     sildenafiL (VIAGRA) 100 MG tablet, Take 1 tablet (100 mg total) by mouth daily as needed for Erectile Dysfunction., Disp: 10 tablet, Rfl: 1    vitamin D (VITAMIN D3) 1000 units Tab, Take 1,000 Units by mouth once daily., Disp: , Rfl:     ammonium lactate (LAC-HYDRIN) 12 % lotion, Apply topically as needed for Dry Skin. (Patient not taking: Reported on 11/17/2022), Disp: 396 g, Rfl: 11    atorvastatin (LIPITOR) 20 MG tablet, Take 1 tablet (20 mg total) by mouth every evening., Disp: 90 tablet, Rfl: 1    blood sugar diagnostic Strp, To check BG 2-3 times daily, to use with insurance preferred meter (Patient not taking: Reported on 11/17/2022), Disp: 150 each, Rfl: 1    blood-glucose meter kit, To check BG 2-3 times daily, to use with insurance preferred meter (Patient not taking: Reported on 11/17/2022), Disp: 1 each, Rfl: 1    carvediloL (COREG) 6.25 MG tablet, Take 1 tablet (6.25 mg total) by mouth 2 (two) times daily with meals., Disp: 180 tablet, Rfl: 1    clopidogreL (PLAVIX) 75 mg tablet, Take 1 tablet (75 mg total) by mouth once daily., Disp: 90 tablet, Rfl: 1    ferrous sulfate (FEOSOL) Tab tablet, Take 1 tablet by mouth daily with breakfast., Disp: , Rfl:     lancets Mis, To check BG 2-3 times daily, to use with insurance preferred meter (Patient not taking: Reported on 11/17/2022), Disp: 150 each, Rfl: 1    lisinopriL (PRINIVIL,ZESTRIL) 20 MG tablet, Take 1 tablet (20 mg total) by mouth 2 (two) times a day., Disp: 180 tablet, Rfl: 0    multivitamin capsule, Take 1 capsule by mouth once daily., Disp: , Rfl:     pen needle, diabetic 32 gauge x 5/16" Ndle, To be used with insulin injections (Patient not taking: " "Reported on 11/17/2022), Disp: 100 each, Rfl: 1    Review of Systems   Constitutional: Negative for chills, diaphoresis, malaise/fatigue and night sweats.   HENT:  Negative for congestion and nosebleeds.    Eyes: Negative.    Cardiovascular:  Negative for chest pain (OCC), claudication, cyanosis, dyspnea on exertion, irregular heartbeat, leg swelling, near-syncope, orthopnea, palpitations, paroxysmal nocturnal dyspnea and syncope.   Respiratory:  Negative for cough, hemoptysis and shortness of breath.    Endocrine: Negative.    Hematologic/Lymphatic: Negative for adenopathy. Does not bruise/bleed easily.   Skin:  Negative for color change and rash.   Musculoskeletal:  Positive for back pain. Negative for falls.   Gastrointestinal:  Positive for constipation. Negative for abdominal pain, dysphagia, heartburn (OCC), melena, nausea and vomiting.   Genitourinary: Negative.  Negative for dysuria and flank pain.   Neurological:  Negative for brief paralysis, focal weakness, headaches, light-headedness, loss of balance, paresthesias and weakness.   Psychiatric/Behavioral:  Negative for altered mental status and depression.    Allergic/Immunologic: Negative for persistent infections.      Objective:      Vitals:    11/17/22 1336 11/17/22 1403   BP: (!) 161/96 (!) 148/88   Pulse: 90    Weight: 78 kg (171 lb 15.3 oz)    Height: 5' 10" (1.778 m)    PainSc: 0-No pain      Body mass index is 24.67 kg/m².    Physical Exam  Constitutional:       Appearance: Normal appearance.   HENT:      Head: Normocephalic and atraumatic.   Eyes:      Extraocular Movements: Extraocular movements intact.      Conjunctiva/sclera: Conjunctivae normal.      Pupils: Pupils are equal, round, and reactive to light.   Neck:      Vascular: No carotid bruit or JVD.   Cardiovascular:      Rate and Rhythm: Normal rate and regular rhythm.      Pulses:           Carotid pulses are 2+ on the right side and 2+ on the left side.       Radial pulses are 2+ on the " right side and 2+ on the left side.        Posterior tibial pulses are 2+ on the right side and 2+ on the left side.      Heart sounds: No murmur heard.    No friction rub. No gallop. No S4 sounds.   Pulmonary:      Breath sounds: No rales.   Abdominal:      Palpations: Abdomen is soft.      Tenderness: There is no abdominal tenderness.   Musculoskeletal:      Cervical back: Neck supple.      Right lower leg: No edema.      Left lower leg: No edema.   Skin:     General: Skin is warm and dry.      Capillary Refill: Capillary refill takes less than 2 seconds.   Neurological:      General: No focal deficit present.      Mental Status: He is alert and oriented to person, place, and time.      Cranial Nerves: No cranial nerve deficit.   Psychiatric:         Mood and Affect: Mood normal.         Behavior: Behavior normal.               ..    Chemistry        Component Value Date/Time     07/27/2022 0920    K 3.8 07/27/2022 0920     07/27/2022 0920    CO2 22 (L) 07/27/2022 0920    BUN 33 (H) 07/27/2022 0920    CREATININE 1.5 (H) 07/27/2022 0920    GLU 76 07/27/2022 0920        Component Value Date/Time    CALCIUM 9.6 07/27/2022 0920    ALKPHOS 70 07/27/2022 0920    AST 26 07/27/2022 0920    ALT 29 07/27/2022 0920    BILITOT 0.5 07/27/2022 0920    ESTGFRAFRICA 59.7 (A) 07/27/2022 0920    EGFRNONAA 51.7 (A) 07/27/2022 0920            ..  Lab Results   Component Value Date    CHOL 134 07/27/2022    CHOL 142 05/10/2022    CHOL 178 01/22/2022     Lab Results   Component Value Date    HDL 50 07/27/2022    HDL 39 (L) 05/10/2022    HDL 48 01/22/2022     Lab Results   Component Value Date    LDLCALC 75.0 07/27/2022    LDLCALC 89.0 05/10/2022    LDLCALC 107.2 01/22/2022     Lab Results   Component Value Date    TRIG 45 07/27/2022    TRIG 70 05/10/2022    TRIG 114 01/22/2022     Lab Results   Component Value Date    CHOLHDL 37.3 07/27/2022    CHOLHDL 27.5 05/10/2022    CHOLHDL 27.0 01/22/2022     ..  Lab Results    Component Value Date    WBC 4.94 07/07/2022    HGB 12.6 (L) 07/27/2022    HCT 38.6 (L) 07/07/2022    MCV 72 (L) 07/07/2022     07/07/2022       Test(s) Reviewed  I have reviewed the following in detail:  [] Stress test   [] Angiography   [] Echocardiogram   [] Labs   [x] Other:       Assessment:         ICD-10-CM ICD-9-CM   1. Essential (primary) hypertension  I10 401.9   2. Atypical chest pain  R07.89 786.59   3. Long term (current) use of antithrombotics/antiplatelets  Z79.02 V58.63   4. Coronary artery disease involving native coronary artery of native heart without angina pectoris  I25.10 414.01   5. Overweight (BMI 25.0-29.9)  E66.3 278.02   6. Chronic kidney disease, stage 2, mildly decreased GFR  N18.2 585.2   7. Hypercholesterolemia  E78.00 272.0   8. Non compliance w medication regimen  Z91.14 V15.81     Problem List Items Addressed This Visit          Cardiac/Vascular    Essential (primary) hypertension - Primary    Hypercholesterolemia    Relevant Medications    atorvastatin (LIPITOR) 20 MG tablet    Coronary artery disease involving native coronary artery of native heart without angina pectoris    Relevant Medications    atorvastatin (LIPITOR) 20 MG tablet       Renal/    Chronic kidney disease, stage 2, mildly decreased GFR       Hematology    Long term (current) use of antithrombotics/antiplatelets       Endocrine    Overweight (BMI 25.0-29.9)       Palliative Care    Non compliance w medication regimen       Other    Atypical chest pain        Plan:         RESTART COREG, OKL TO HOLD PLAVIX 3-5 DAYS FOR DENTAL WORK, ALL CV CLINICALLY STABLE, NO ANGINA, NO HF, NO TIA, NO CLINICAL ARRHYTHMIA,CONTINUE CURRENT MEDS, EDUCATION, DIET, EXERCISE , HYDRATION, EXPLAINED RISKS OF NON COMPLIANCE TO PT AND GIRLFRIEND/ UNDERSTAND  Essential (primary) hypertension    Atypical chest pain    Long term (current) use of antithrombotics/antiplatelets    Coronary artery disease involving native coronary artery  of native heart without angina pectoris  -     atorvastatin (LIPITOR) 20 MG tablet; Take 1 tablet (20 mg total) by mouth every evening.  Dispense: 90 tablet; Refill: 1    Overweight (BMI 25.0-29.9)    Chronic kidney disease, stage 2, mildly decreased GFR    Hypercholesterolemia  -     atorvastatin (LIPITOR) 20 MG tablet; Take 1 tablet (20 mg total) by mouth every evening.  Dispense: 90 tablet; Refill: 1    Non compliance w medication regimen    Other orders  -     carvediloL (COREG) 6.25 MG tablet; Take 1 tablet (6.25 mg total) by mouth 2 (two) times daily with meals.  Dispense: 180 tablet; Refill: 1  -     clopidogreL (PLAVIX) 75 mg tablet; Take 1 tablet (75 mg total) by mouth once daily.  Dispense: 90 tablet; Refill: 1  -     lisinopriL (PRINIVIL,ZESTRIL) 20 MG tablet; Take 1 tablet (20 mg total) by mouth 2 (two) times a day.  Dispense: 180 tablet; Refill: 0  RTC Low level/low impact aerobic exercise 5x's/wk. Heart healthy diet and risk factor modification.    See labs and med orders.    Aerobic exercise 5x's/wk. Heart healthy diet and risk factor modification.    See labs and med orders.

## 2022-11-19 PROBLEM — Z91.148 NON COMPLIANCE W MEDICATION REGIMEN: Status: ACTIVE | Noted: 2022-11-19

## 2022-12-29 ENCOUNTER — TELEPHONE (OUTPATIENT)
Dept: CARDIOLOGY | Facility: CLINIC | Age: 57
End: 2022-12-29
Payer: MEDICARE

## 2022-12-29 NOTE — TELEPHONE ENCOUNTER
----- Message from Nasima Kaur sent at 12/29/2022 11:17 AM CST -----  .Type:  Patient Call Back    Who Called: PT       Does the patient know what this is regarding?: PT BP HAS BEEN HIGH 157/69 WITH LIGHT CHEST PAINS PLEASE ADVISE     Would the patient rather a call back YES     Best Call Back Number: [phone]    Additional Information: Thank You

## 2023-01-18 ENCOUNTER — NURSE TRIAGE (OUTPATIENT)
Dept: ADMINISTRATIVE | Facility: CLINIC | Age: 58
End: 2023-01-18
Payer: MEDICARE

## 2023-01-18 NOTE — TELEPHONE ENCOUNTER
"Friend Digna, calling with concerns for low Spo2. This morning Spo2 was 93% and pt was c/o SOB. Worse Spo2 last night. Pt also recently had COVID. Called number provided by Digna, No answer, LVM.     @1112 s/w pt: Spo2 dropped to 90% last night. Pt reports SOB when laying down flat. Denies SOB with exertion or walking around. Pt also reports int episodes of dizziness and pain to middle of chest when laying down.     Spo2 currently reading 93-95%, denies SOB or c/o at this time.     PCP (non-[facility]), has appt with PCP 2/1    Because s/s r/t positional SOB, will route message to cardiology for f/u within hour. Also encouraged pt to call PCP to discuss s/s  Reason for Disposition   Oxygen level (e.g., pulse oximetry) 91 to 94 percent    Additional Information   Negative: SEVERE difficulty breathing (e.g., struggling for each breath, speaks in single words, pulse > 120)   Negative: Breathing stopped and hasn't returned   Negative: Choking on something   Negative: Bluish (or gray) lips or face   Negative: Difficult to awaken or acting confused (e.g., disoriented, slurred speech)   Negative: Passed out (i.e., fainted, collapsed and was not responding)   Negative: Wheezing started suddenly after medicine, an allergic food, or bee sting   Negative: Stridor   Negative: Slow, shallow and weak breathing   Negative: Sounds like a life-threatening emergency to the triager   Negative: MODERATE difficulty breathing (e.g., speaks in phrases, SOB even at rest, pulse 100-120) of new-onset or worse than normal   Negative: Oxygen level (e.g., pulse oximetry) 90 percent or lower   Negative: Wheezing can be heard across the room   Negative: Drooling or spitting out saliva (because can't swallow)   Negative: Any history of prior "blood clot" in leg or lungs   Negative: Illness requiring prolonged bedrest in past month (e.g., immobilization, long hospital stay)   Negative: Hip or leg fracture (broken bone) in past month (or had cast on " "leg or ankle in past month)   Negative: Major surgery in the past month   Negative: Long-distance travel in past month (e.g., car, bus, train, plane; with trip lasting 6 or more hours)   Negative: Cancer treatment in past six months (or has cancer now)   Negative: Extra heart beats OR irregular heart beating (i.e., "palpitations")   Negative: Fever > 103 F (39.4 C)   Negative: Fever > 101 F (38.3 C) and over 60 years of age   Negative: Fever > 100.0 F (37.8 C) and bedridden (e.g., nursing home patient, stroke, chronic illness, recovering from surgery)   Negative: Fever > 100.0 F (37.8 C) and diabetes mellitus or weak immune system (e.g., HIV positive, cancer chemo, splenectomy, organ transplant, chronic steroids)   Negative: Periods where breathing stops and then resumes normally and bedridden (e.g., nursing home patient, CVA)   Negative: Pregnant or postpartum (from 0 to 6 weeks after delivery)   Negative: Patient sounds very sick or weak to the triager   Negative: MILD difficulty breathing (e.g., minimal/no SOB at rest, SOB with walking, pulse < 100) of new-onset or worse than normal   Negative: Longstanding difficulty breathing (e.g., CHF, COPD, emphysema) and worse than normal   Negative: Longstanding difficulty breathing and not responding to usual therapy   Negative: Continuous (nonstop) coughing   Negative: Patient wants to be seen    Protocols used: Breathing Difficulty-A-OH    "

## 2023-02-03 ENCOUNTER — TELEPHONE (OUTPATIENT)
Dept: CARDIOLOGY | Facility: CLINIC | Age: 58
End: 2023-02-03
Payer: MEDICARE

## 2023-04-27 ENCOUNTER — TELEPHONE (OUTPATIENT)
Dept: CARDIOLOGY | Facility: CLINIC | Age: 58
End: 2023-04-27
Payer: MEDICARE

## 2023-05-31 DIAGNOSIS — I10 ESSENTIAL (PRIMARY) HYPERTENSION: Primary | ICD-10-CM

## 2023-05-31 RX ORDER — CARVEDILOL 6.25 MG/1
TABLET ORAL
Qty: 180 TABLET | Refills: 1 | Status: SHIPPED | OUTPATIENT
Start: 2023-05-31

## 2023-06-13 ENCOUNTER — TELEPHONE (OUTPATIENT)
Dept: CARDIOLOGY | Facility: CLINIC | Age: 58
End: 2023-06-13
Payer: MEDICARE

## 2023-06-13 NOTE — TELEPHONE ENCOUNTER
----- Message from Bg Ash sent at 6/13/2023  8:02 AM CDT -----  Contact: spouse  Type:  Same Day Appointment Request    Caller is requesting a same day appointment.  Caller declined first available appointment listed below.      Name of Caller:  Digna (spouse)  When is the first available appointment?  6/22  Symptoms:  chest pain (stabbing pain)  Best Call Back Number:  [phone]    Additional Information:   Spouse states pt feels like stabbing pain that goes from back to front of chest  Thank you

## 2023-06-14 PROBLEM — R94.39 ABNORMAL STRESS TEST: Status: ACTIVE | Noted: 2023-06-14

## 2023-06-14 PROBLEM — F19.10 POLYSUBSTANCE ABUSE: Status: ACTIVE | Noted: 2023-06-14

## 2023-08-25 ENCOUNTER — TELEPHONE (OUTPATIENT)
Dept: CARDIOLOGY | Facility: CLINIC | Age: 58
End: 2023-08-25
Payer: MEDICARE

## 2023-08-25 NOTE — TELEPHONE ENCOUNTER
Spoke with SO: she was looking for appt on Monday --Dr Drake not in clinic that day. Will keep appt on Friday

## 2023-08-25 NOTE — TELEPHONE ENCOUNTER
----- Message from Toby Fitzpatrick sent at 8/25/2023  8:45 AM CDT -----  Contact: self  Type: Sooner Appointment Request        Caller is requesting a sooner appointment. Caller declined first available appointment listed below. Caller will not accept being placed on the waitlist and is requesting a message be sent to doctor.        Name of Caller: Patient   Best Call Back Number: [phone]  Additional Information: Pt states he went to the ER for his feet but was told his enzymes was high and his kidney function was high Er states might be affecting his heart plz try to get pt scheduled sooner than net Friday. Thanks        Call returned to pt. DME order placed to [facility] in [place]. No further questions or concerns.